# Patient Record
Sex: MALE | Race: WHITE | NOT HISPANIC OR LATINO | Employment: FULL TIME | URBAN - METROPOLITAN AREA
[De-identification: names, ages, dates, MRNs, and addresses within clinical notes are randomized per-mention and may not be internally consistent; named-entity substitution may affect disease eponyms.]

---

## 2020-10-19 ENCOUNTER — TRANSFERRED RECORDS (OUTPATIENT)
Dept: HEALTH INFORMATION MANAGEMENT | Facility: CLINIC | Age: 55
End: 2020-10-19

## 2020-11-06 ENCOUNTER — TELEPHONE (OUTPATIENT)
Dept: CARDIOLOGY | Facility: CLINIC | Age: 55
End: 2020-11-06

## 2020-11-06 NOTE — TELEPHONE ENCOUNTER
Pt was seen at [facility] in care everywhere.     Called and asked for EKG rhythmn strip from 9/25/2020 to be faxed.     Also asked for them to mail disc with echo done on 8/27/2020.    Rajni Taylor CMA......November 6, 2020     10:03 AM

## 2020-11-07 ENCOUNTER — HEALTH MAINTENANCE LETTER (OUTPATIENT)
Age: 55
End: 2020-11-07

## 2020-11-13 ENCOUNTER — OFFICE VISIT (OUTPATIENT)
Dept: CARDIOLOGY | Facility: CLINIC | Age: 55
End: 2020-11-13
Payer: COMMERCIAL

## 2020-11-13 VITALS
SYSTOLIC BLOOD PRESSURE: 162 MMHG | RESPIRATION RATE: 16 BRPM | OXYGEN SATURATION: 97 % | WEIGHT: 185 LBS | DIASTOLIC BLOOD PRESSURE: 74 MMHG | HEART RATE: 74 BPM

## 2020-11-13 DIAGNOSIS — I25.5 ISCHEMIC CARDIOMYOPATHY: ICD-10-CM

## 2020-11-13 DIAGNOSIS — I47.29 NSVT (NONSUSTAINED VENTRICULAR TACHYCARDIA) (H): ICD-10-CM

## 2020-11-13 DIAGNOSIS — I25.10 CORONARY ARTERY DISEASE INVOLVING NATIVE CORONARY ARTERY OF NATIVE HEART WITHOUT ANGINA PECTORIS: Primary | ICD-10-CM

## 2020-11-13 DIAGNOSIS — I49.3 PVC'S (PREMATURE VENTRICULAR CONTRACTIONS): ICD-10-CM

## 2020-11-13 PROCEDURE — 99204 OFFICE O/P NEW MOD 45 MIN: CPT | Performed by: INTERNAL MEDICINE

## 2020-11-13 RX ORDER — NITROGLYCERIN 0.4 MG/1
TABLET SUBLINGUAL
COMMUNITY
Start: 2020-09-11

## 2020-11-13 RX ORDER — CLOPIDOGREL BISULFATE 75 MG/1
75 TABLET ORAL
COMMUNITY
Start: 2020-09-12 | End: 2021-09-27

## 2020-11-13 RX ORDER — LISINOPRIL 10 MG/1
10 TABLET ORAL DAILY
Qty: 90 TABLET | Refills: 3 | Status: SHIPPED | OUTPATIENT
Start: 2020-11-13 | End: 2021-11-05

## 2020-11-13 RX ORDER — AMLODIPINE BESYLATE 10 MG/1
TABLET ORAL
COMMUNITY
Start: 2020-08-03 | End: 2021-09-01

## 2020-11-13 RX ORDER — METOPROLOL SUCCINATE 25 MG/1
25 TABLET, EXTENDED RELEASE ORAL
COMMUNITY
Start: 2020-11-05 | End: 2021-08-10

## 2020-11-13 RX ORDER — ATORVASTATIN CALCIUM 40 MG/1
40 TABLET, FILM COATED ORAL
COMMUNITY
Start: 2020-09-11 | End: 2021-09-09

## 2020-11-13 NOTE — PATIENT INSTRUCTIONS
Stress cardiac MRI at [facility] next available.   Get home BP monitor. Check it once daily.     Start Lisinopril 10 mg every day.   Check labs in 1 week.

## 2020-11-13 NOTE — PROGRESS NOTES
[facility] Cardiology Consultation:    Assessment and Plan:     1.  Multivessel CAD, PCI to mid LAD 9/2020,  of OM 2 and proximal RCA, mildly reduced LV function: No angina or CHF.   Continue aspirin lifelong, Plavix for 1 year.  Continue Lipitor, check lipid profile with goal LDL less than 70.  2.  Mild ischemic cardiomyopathy, LVEF 45 to 50%: Continue Toprol 25 mg.  Start lisinopril 10 mg, check BMP in 1 week.  3.  High PVC burden of 25%, asymptomatic: We will evaluate further with stress cardiac MRI.  4.  Hypertension: High office BP today.  Asked to get blood pressure monitor and monitor at home.      Omkar Jimenez MD    Cardiac Imaging and Prevention  [facility]  [email or web address] Pager: [phone]    HPI: He is here to establish cardiac care.  His cardiac history started a few months ago when he was diagnosed with PVCs.  An echocardiogram then showed mild cardiomyopathy that appeared to be ischemic.  A coronary angiogram was then done that showed multivessel CAD.  He received a stent to his mid LAD, and also had occlusion of his proximal RCA and OM 2 branch that were collateralized and not intervened upon.  He has been started on appropriate medical therapy.  A cardiac MRI was discussed at [facility].  Recent cardiac monitor showed frequent PVCs with a burden of 25%.  He is asymptomatic from his palpitations.  In fact he has no symptoms at all.  He is tolerating his new medications without any issues.   Denies any chest pain or pressure, shortness of breath/dyspnea, orthopnea, pnd, palpitations, syncope/presyncope or edema.  EXAM:  BP (!) 162/74   Pulse 74   Resp 16   Wt 83.9 kg (185 lb)   SpO2 97%   GEN/CONSTITUIONAL: Appears comfortable, in no apparent distress   EYES: No icterus  ENT/MOUTH: Normal  JVP:  Not visible  RESPIRATORY: Clear to auscultation bilaterally   CARDIOVASCULAR: Regular S1 and S2, no murmurs, rubs, or gallops.   ABDOMEN: Soft,  non-tender, positive bowel sounds   NEUROLOGIC: Grossly non-focal   PSYCHIATRIC: Normal affect  EXT: No cyanosis, clubbing, edema. Normal pedal pulses.  Skin: No petechiae, purpura or rash    PAST MEDICAL HISTORY:  Past Medical History:   Diagnosis Date     Benign essential hypertension      Coronary artery disease involving native coronary artery of native heart without angina pectoris     PCI to LAD 09/2020     PVC's (premature ventricular contractions)        CURRENT MEDICATIONS:  Current Outpatient Medications   Medication     amLODIPine (NORVASC) 10 MG tablet     aspirin (ASA) 81 MG EC tablet     atorvastatin (LIPITOR) 40 MG tablet     cholecalciferol (VITAMIN D3) 25 mcg (1000 units) capsule     clopidogrel (PLAVIX) 75 MG tablet     metoprolol succinate ER (TOPROL-XL) 25 MG 24 hr tablet     nitroGLYcerin (NITROSTAT) 0.4 MG sublingual tablet     No current facility-administered medications for this visit.        PAST SURGICAL HISTORY:  No past surgical history on file.    ALLERGIES     Allergies   Allergen Reactions     Simvastatin      Other reaction(s): Unknown  Did not feel right          FAMILY HISTORY:  No family history on file.    SOCIAL HISTORY:  Social History     Socioeconomic History     Marital status:      Spouse name: Not on file     Number of children: Not on file     Years of education: Not on file     Highest education level: Not on file   Occupational History     Not on file   Social Needs     Financial resource strain: Not on file     Food insecurity     Worry: Not on file     Inability: Not on file     Transportation needs     Medical: Not on file     Non-medical: Not on file   Tobacco Use     Smoking status: Not on file   Substance and Sexual Activity     Alcohol use: Not on file     Drug use: Not on file     Sexual activity: Not on file   Lifestyle     Physical activity     Days per week: Not on file     Minutes per session: Not on file     Stress: Not on file   Relationships      Social connections     Talks on phone: Not on file     Gets together: Not on file     Attends [employer]alian service: Not on file     Active member of club or organization: Not on file     Attends meetings of clubs or organizations: Not on file     Relationship status: Not on file     Intimate partner violence     Fear of current or ex partner: Not on file     Emotionally abused: Not on file     Physically abused: Not on file     Forced sexual activity: Not on file   Other Topics Concern     Not on file   Social History Narrative     Not on file       ROS:   Constitutional: No fever, chills, or sweats. No weight gain/loss   ENT: No visual disturbance, ear ache, epistaxis, sore throat  Allergies/Immunologic: Negative.   Respiratory: No cough, hemoptysia  Cardiovascular: As per HPI  GI: No nausea, vomiting, hematemesis, melena, or hematochezia  : No urinary frequency, dysuria, or hematuria  Integument: Negative  Psychiatric: Negative  Neuro: Negative  Endocrinology: Negative   Musculoskeletal: Negative    ADDITIONAL COMMENTS:     I reviewed the patient's medications:     I reviewed the patient's pertinent clinical laboratory studies:     I reviewed the patient's pertinent imaging studies:   I reviewed the patient's ECG:       Cath 09/2020  Diagnostic Summary    * Left main is a large-caliber normal vessel. Left anterior ascending artery  is a moderate-caliber vessel and gives rise to 1 major proximal diagonal  branch.  The diagonal branch is normal. Mid LAD has mild 30% narrowing and  another  area of diffuse 70% narrowing.  It is a wrap-around LAD. Left  circumflex is a large-caliber vessel.  There is a small caliber first obtuse  marginal branch which is normal.  There is a small occluded second obtuse  marginal branch which is filled via collaterals.  The third obtuse marginal  branch is small in caliber and appears normal.  The AV groove LCx continues as  a moderate caliber distal PDA which has mild disease. Right  coronary artery  appears to be a small caliber, possibly codominant, vessel which is occluded  proximally.  Extensive collaterals seen filling the distal RCA via left to  right and right to right.    Echo Interpretation Summary    * The left ventricular systolic function is mildly decreased, estimated LVEF  45-50%.    * Left ventricular wall motion is abnormal with focal segmental hypokinesis  involving basal to mid inferior and basal inferolateral and inferoseptal  walls..    * The left ventricle is moderately enlarged.    * Normal right ventricular systolic function.    * The proximal ascending aorta is borderline dilated measuring 3.8 cm.    * No pulmonary hypertension, estimated right ventricular systolic pressure  is 29 mmHg.    * There is no hemodynamically significant valvular heart disease.    * No prior study.

## 2020-11-20 DIAGNOSIS — I25.10 CORONARY ARTERY DISEASE INVOLVING NATIVE CORONARY ARTERY OF NATIVE HEART WITHOUT ANGINA PECTORIS: ICD-10-CM

## 2020-11-20 LAB
ANION GAP SERPL CALCULATED.3IONS-SCNC: 2 MMOL/L (ref 3–14)
BUN SERPL-MCNC: 12 MG/DL (ref 7–30)
CALCIUM SERPL-MCNC: 8.9 MG/DL (ref 8.5–10.1)
CHLORIDE SERPL-SCNC: 104 MMOL/L (ref 94–109)
CHOLEST SERPL-MCNC: 136 MG/DL
CO2 SERPL-SCNC: 32 MMOL/L (ref 20–32)
CREAT SERPL-MCNC: 0.83 MG/DL (ref 0.66–1.25)
GFR SERPL CREATININE-BSD FRML MDRD: >90 ML/MIN/{1.73_M2}
GLUCOSE SERPL-MCNC: 105 MG/DL (ref 70–99)
HDLC SERPL-MCNC: 79 MG/DL
LDLC SERPL CALC-MCNC: 45 MG/DL
NONHDLC SERPL-MCNC: 57 MG/DL
POTASSIUM SERPL-SCNC: 4.2 MMOL/L (ref 3.4–5.3)
SODIUM SERPL-SCNC: 138 MMOL/L (ref 133–144)
TRIGL SERPL-MCNC: 60 MG/DL

## 2020-11-20 PROCEDURE — 36415 COLL VENOUS BLD VENIPUNCTURE: CPT | Performed by: INTERNAL MEDICINE

## 2020-11-20 PROCEDURE — 80061 LIPID PANEL: CPT | Performed by: INTERNAL MEDICINE

## 2020-11-20 PROCEDURE — 80048 BASIC METABOLIC PNL TOTAL CA: CPT | Performed by: INTERNAL MEDICINE

## 2020-12-02 ENCOUNTER — VIRTUAL VISIT (OUTPATIENT)
Dept: DERMATOLOGY | Facility: CLINIC | Age: 55
End: 2020-12-02
Payer: COMMERCIAL

## 2020-12-02 DIAGNOSIS — D48.5 NEOPLASM OF UNCERTAIN BEHAVIOR OF SKIN: ICD-10-CM

## 2020-12-02 DIAGNOSIS — L82.1 SEBORRHEIC KERATOSES: Primary | ICD-10-CM

## 2020-12-02 PROCEDURE — 99202 OFFICE O/P NEW SF 15 MIN: CPT | Mod: TEL | Performed by: PHYSICIAN ASSISTANT

## 2020-12-02 NOTE — LETTER
12/2/2020         RE: Jerrod Khan  [address] [address]        Dear Colleague,    Thank you for referring your patient, Jerrod Khan, to the [facility]. Please see a copy of my visit note below.    [facility] Dermatology Record:  Store and Forward and Telephone [phone]      Dermatology Problem List:  1. SK  2. NUB - back - ddx: lentigo vs MIS/DN - recheck in person -possibly bx    Encounter Date: Dec 2, 2020    CC:   Chief Complaint   Patient presents with     Derm Problem       History of Present Illness:  Jerrod Khan is a 55 year old male who presents for a spot check. No personal or fhx of skin cancer.     Lesion on back has been present 1-2 years, wife noticed it, no associated sx. Located on L upper back.    Additional spot on the L side of the back, present about 1-2 years as well. Has had blistering sun burns in the past. Does use sunscreen now. Otherwise well, no additional concerns.       ROS: Patient is generally feeling well today   -Constitutional: no unintended weight loss/gain, no night sweats or fevers  -Skin as per HPI    Physical Examination:  General: Well-appearing, appropriately-developed individual.  Skin: Focused examination including back was performed.   -waxy stuck on papule on the L back - appears benign, consistent with SK  -L back with tan asymmetrical macule - ddx: lentigo vs other      Past Medical History:   There is no problem list on file for this patient.    Past Medical History:   Diagnosis Date     Benign essential hypertension      Coronary artery disease involving native coronary artery of native heart without angina pectoris     PCI to LAD 09/2020     PVC's (premature ventricular contractions)      No past surgical history on file.    Social History:  Patient     Family History:  No family history on file.    Medications:  Current Outpatient Medications   Medication     amLODIPine (NORVASC)  10 MG tablet     aspirin (ASA) 81 MG EC tablet     atorvastatin (LIPITOR) 40 MG tablet     cholecalciferol (VITAMIN D3) 25 mcg (1000 units) capsule     clopidogrel (PLAVIX) 75 MG tablet     lisinopril (ZESTRIL) 10 MG tablet     metoprolol succinate ER (TOPROL-XL) 25 MG 24 hr tablet     nitroGLYcerin (NITROSTAT) 0.4 MG sublingual tablet     No current facility-administered medications for this visit.           Allergies   Allergen Reactions     Simvastatin      Other reaction(s): Unknown  Did not feel right          Impression and Recommendations (Patient Counseled on the Following):  1. SK -back x1  -Lesion is clinically benign and no intervention is needed at this time. Recommend re-evaluation if lesions grows or symptoms change.     2. Lesion to monitor - L back - tan asymmetrical macule - ddx: lentigo vs nevus vs DN vs MIS vs other   - come in for potential biopsy of this lesion pending in person examination and FBSE in next 1-2mo    Follow-up:   Follow-up with dermatology in approximately 1-2mo. Earlier for new or changing lesions or rash.   CC Dr. Nelson on close of this encounter.      Staff only    All risks, benefits and alternatives were discussed with patient.  Patient is in agreement and understands the assessment and plan.  All questions were answered.    Laurence Newman PA-C, MPAS  [facility] [place]: Phone: [phone], Fax: [phone]  [facility]: Phone: [phone],  Fax: [phone]  _____________________________________________________________________________    Teledermatology information:  - Location of patient: [place]  - Patient presented as: self referral  - Location of teledermatologist:  ([facility] )  - Image quality and interpretability: acceptable  - Physician has received verbal consent for a Video/Photos Visit from the patient? YES  - In-person dermatology visit  "recommendation: yes  - Date of images: 11/11/20  - Length of call: 7min  - Date of report: 12/2/2020     Teledermatology Nurse Call for NEW Patients (not seen in last 3 years):     The patient was contacted by phone and we reviewed they have a visit in teledermatology upcoming with an MD or PARVEEN;  Importantly, \"a teledermatology visit may not be as thorough as an in-person visit, and the quality of the photograph and/or video sent may not be of the same quality as that taken by the dermatology clinic.\"     This video visit will be conducted via a call between you and your physician/provider via [employer]. We have found that certain health care needs can be provided without the need for an in-person physical exam.  This service lets us provide the care you need with a video conversation.  If a prescription is necessary we can send it directly to your pharmacy.  If lab work is needed we can place an order for that and you can then stop by our lab to have the test done at a later time. Visits are billed at different rates depending on your insurance coverage. Please reach out to your insurance provider with any questions.    The patient will also send photographs via [employer] for review. Instructions for photography are/were sent to the patient via [employer] messaging.       The patient verified the location of the photo/video visit to be [place].(The physician must be notified by nurse if the patient is not in the state of [place] during the encounter)    The patient denied skin pain, fever, mucosal symptoms(lesions, blisters, sores in the mouth, nose, eyes, or genitals)  IF PATIENT ENDORSES ANY OF THESE STOP AND PAGE ON CALL ATTENDING    Additional notes:  Patient summary of issue: Spot on back. Has grown over the last year. Has had spot at least a few years.  Location of problem on body: Back  How long has area/symptoms been present: Over the last year it has grown  What makes it better?: N/a  What makes it worse?: " n/a  Other symptoms include the following: N/a  Which medications have been tried, for how long, and did they make it better or worse (ex. Triamcinolone, used twice daily for 2 weeks, not improved): n/a  The patient has not seen a dermatologist.   The patient hasno past medical history of skin cancer  ROS: The patient is generally feeling well.     Yvette Palencia LPN        Again, thank you for allowing me to participate in the care of your patient.        Sincerely,        Laurence Newman PA-C

## 2020-12-02 NOTE — PROGRESS NOTES
[facility] Dermatology Record:  Store and Forward and Telephone [phone]      Dermatology Problem List:  1. SK  2. NUB - back - ddx: lentigo vs MIS/DN - recheck in person -possibly bx    Encounter Date: Dec 2, 2020    CC:   Chief Complaint   Patient presents with     Derm Problem       History of Present Illness:  Jerrod Khan is a 55 year old male who presents for a spot check. No personal or fhx of skin cancer.     Lesion on back has been present 1-2 years, wife noticed it, no associated sx. Located on L upper back.    Additional spot on the L side of the back, present about 1-2 years as well. Has had blistering sun burns in the past. Does use sunscreen now. Otherwise well, no additional concerns.       ROS: Patient is generally feeling well today   -Constitutional: no unintended weight loss/gain, no night sweats or fevers  -Skin as per HPI    Physical Examination:  General: Well-appearing, appropriately-developed individual.  Skin: Focused examination including back was performed.   -waxy stuck on papule on the L back - appears benign, consistent with SK  -L back with tan asymmetrical macule - ddx: lentigo vs other      Past Medical History:   There is no problem list on file for this patient.    Past Medical History:   Diagnosis Date     Benign essential hypertension      Coronary artery disease involving native coronary artery of native heart without angina pectoris     PCI to LAD 09/2020     PVC's (premature ventricular contractions)      No past surgical history on file.    Social History:  Patient     Family History:  No family history on file.    Medications:  Current Outpatient Medications   Medication     amLODIPine (NORVASC) 10 MG tablet     aspirin (ASA) 81 MG EC tablet     atorvastatin (LIPITOR) 40 MG tablet     cholecalciferol (VITAMIN D3) 25 mcg (1000 units) capsule     clopidogrel (PLAVIX) 75 MG tablet     lisinopril (ZESTRIL) 10 MG tablet     metoprolol succinate ER (TOPROL-XL) 25 MG 24  hr tablet     nitroGLYcerin (NITROSTAT) 0.4 MG sublingual tablet     No current facility-administered medications for this visit.           Allergies   Allergen Reactions     Simvastatin      Other reaction(s): Unknown  Did not feel right          Impression and Recommendations (Patient Counseled on the Following):  1. SK -back x1  -Lesion is clinically benign and no intervention is needed at this time. Recommend re-evaluation if lesions grows or symptoms change.     2. Lesion to monitor - L back - tan asymmetrical macule - ddx: lentigo vs nevus vs DN vs MIS vs other   - come in for potential biopsy of this lesion pending in person examination and FBSE in next 1-2mo    Follow-up:   Follow-up with dermatology in approximately 1-2mo. Earlier for new or changing lesions or rash.   CC Dr. Nelson on close of this encounter.      Staff only    All risks, benefits and alternatives were discussed with patient.  Patient is in agreement and understands the assessment and plan.  All questions were answered.    Laurence Newman PA-C, MPAS  [facility]: Phone: [phone], Fax: [phone]  [facility]: Phone: [phone],  Fax: [phone]  _____________________________________________________________________________    Teledermatology information:  - Location of patient: [place]  - Patient presented as: self referral  - Location of teledermatologist:  ([facility] )  - Image quality and interpretability: acceptable  - Physician has received verbal consent for a Video/Photos Visit from the patient? YES  - In-person dermatology visit recommendation: yes  - Date of images: 11/11/20  - Length of call: 7min  - Date of report: 12/2/2020     Teledermatology Nurse Call for NEW Patients (not seen in last 3 years):     The patient was contacted by phone and we reviewed they have a visit in teledermatology upcoming with  "an MD or PARVEEN;  Importantly, \"a teledermatology visit may not be as thorough as an in-person visit, and the quality of the photograph and/or video sent may not be of the same quality as that taken by the dermatology clinic.\"     This video visit will be conducted via a call between you and your physician/provider via [employer]. We have found that certain health care needs can be provided without the need for an in-person physical exam.  This service lets us provide the care you need with a video conversation.  If a prescription is necessary we can send it directly to your pharmacy.  If lab work is needed we can place an order for that and you can then stop by our lab to have the test done at a later time. Visits are billed at different rates depending on your insurance coverage. Please reach out to your insurance provider with any questions.    The patient will also send photographs via [employer] for review. Instructions for photography are/were sent to the patient via [employer] messaging.       The patient verified the location of the photo/video visit to be [place].(The physician must be notified by nurse if the patient is not in the state of [place] during the encounter)    The patient denied skin pain, fever, mucosal symptoms(lesions, blisters, sores in the mouth, nose, eyes, or genitals)  IF PATIENT ENDORSES ANY OF THESE STOP AND PAGE ON CALL ATTENDING    Additional notes:  Patient summary of issue: Spot on back. Has grown over the last year. Has had spot at least a few years.  Location of problem on body: Back  How long has area/symptoms been present: Over the last year it has grown  What makes it better?: N/a  What makes it worse?: n/a  Other symptoms include the following: N/a  Which medications have been tried, for how long, and did they make it better or worse (ex. Triamcinolone, used twice daily for 2 weeks, not improved): n/a  The patient has not seen a dermatologist.   The patient hasno past medical history of " skin cancer  ROS: The patient is generally feeling well.     Yvette Palencia LPN

## 2020-12-02 NOTE — PATIENT INSTRUCTIONS
[facility] Dermatology Visit    Thank you for allowing us to participate in your care. Your findings, instructions and follow-up plan are as follows:    Follow up for in person exam    When should I call my doctor?    If you are worsening or not improving, please, contact us or seek urgent care as noted below.     Who should I call with questions (adults)?    [facility] (adult and pediatric): [phone]     [facility] (adult): [phone]    For urgent needs outside of business hours call the [facility] at [phone] and ask for the dermatology resident on call    If this is a medical emergency and you are unable to reach an ER, Call 911      Who should I call with questions (pediatric)?  [facility]- Pediatric Dermatology  Dr. Penny Roberts, Dr. Robert Tanner, Dr. Cherelle Rodriguez, Shu Chavez, PA  Dr. Marlys Chu, Dr. Maria Del Carmen Murguia & Dr. Oliver Nuñez  Non Urgent  Nurse Triage Line; [phone]- Patricia and Erendira GONZALES Care Coordinators   Dawna (/Complex ) [phone]    If you need a prescription refill, please contact your pharmacy. Refills are approved or denied by our Physicians during normal business hours, Monday through Fridays  Per office policy, refills will not be granted if you have not been seen within the past year (or sooner depending on your child's condition)    Scheduling Information:  Pediatric Appointment Scheduling and Call Center [phone]  Radiology Scheduling- [phone]  Sedation Unit Scheduling- [phone]  [place] Scheduling- General [phone]; Pediatric Dermatology [phone]  Main  Services: [phone]  Albanian: [phone]  North Korean: [phone]  Hmong/Somali/Persian: [phone]  Preadmission Nursing Department Fax Number: [phone] (Fax all pre-operative paperwork to this number)    For urgent matters  arising during evenings, weekends, or holidays that cannot wait for normal business hours please call [phone] and ask for the Dermatology Resident On-Call to be paged.

## 2020-12-30 ENCOUNTER — HOSPITAL ENCOUNTER (OUTPATIENT)
Dept: MRI IMAGING | Facility: CLINIC | Age: 55
Discharge: HOME | End: 2020-12-30
Attending: INTERNAL MEDICINE | Admitting: INTERNAL MEDICINE
Payer: COMMERCIAL

## 2020-12-30 VITALS
SYSTOLIC BLOOD PRESSURE: 122 MMHG | RESPIRATION RATE: 18 BRPM | OXYGEN SATURATION: 98 % | HEART RATE: 48 BPM | DIASTOLIC BLOOD PRESSURE: 67 MMHG

## 2020-12-30 DIAGNOSIS — I25.10 CORONARY ARTERY DISEASE INVOLVING NATIVE CORONARY ARTERY OF NATIVE HEART WITHOUT ANGINA PECTORIS: ICD-10-CM

## 2020-12-30 DIAGNOSIS — I25.5 ISCHEMIC CARDIOMYOPATHY: ICD-10-CM

## 2020-12-30 DIAGNOSIS — I47.29 NSVT (NONSUSTAINED VENTRICULAR TACHYCARDIA) (H): ICD-10-CM

## 2020-12-30 DIAGNOSIS — I49.3 PVC'S (PREMATURE VENTRICULAR CONTRACTIONS): ICD-10-CM

## 2020-12-30 PROCEDURE — A9585 GADOBUTROL INJECTION: HCPCS | Performed by: INTERNAL MEDICINE

## 2020-12-30 PROCEDURE — 250N000011 HC RX IP 250 OP 636: Performed by: STUDENT IN AN ORGANIZED HEALTH CARE EDUCATION/TRAINING PROGRAM

## 2020-12-30 PROCEDURE — 93017 CV STRESS TEST TRACING ONLY: CPT

## 2020-12-30 PROCEDURE — 93018 CV STRESS TEST I&R ONLY: CPT | Performed by: STUDENT IN AN ORGANIZED HEALTH CARE EDUCATION/TRAINING PROGRAM

## 2020-12-30 PROCEDURE — 93010 ELECTROCARDIOGRAM REPORT: CPT | Mod: 76 | Performed by: INTERNAL MEDICINE

## 2020-12-30 PROCEDURE — 255N000002 HC RX 255 OP 636: Performed by: INTERNAL MEDICINE

## 2020-12-30 PROCEDURE — 75563 CARD MRI W/STRESS IMG & DYE: CPT

## 2020-12-30 PROCEDURE — 93005 ELECTROCARDIOGRAM TRACING: CPT

## 2020-12-30 PROCEDURE — 93016 CV STRESS TEST SUPVJ ONLY: CPT | Performed by: STUDENT IN AN ORGANIZED HEALTH CARE EDUCATION/TRAINING PROGRAM

## 2020-12-30 PROCEDURE — 999N000054 HC STATISTIC EKG NON-CHARGEABLE

## 2020-12-30 PROCEDURE — 75563 CARD MRI W/STRESS IMG & DYE: CPT | Mod: 26 | Performed by: STUDENT IN AN ORGANIZED HEALTH CARE EDUCATION/TRAINING PROGRAM

## 2020-12-30 RX ORDER — AMINOPHYLLINE 25 MG/ML
100 INJECTION, SOLUTION INTRAVENOUS ONCE
Status: COMPLETED | OUTPATIENT
Start: 2020-12-30 | End: 2020-12-30

## 2020-12-30 RX ORDER — DIPHENHYDRAMINE HCL 25 MG
25 CAPSULE ORAL
Status: DISCONTINUED | OUTPATIENT
Start: 2020-12-30 | End: 2020-12-31 | Stop reason: HOSPADM

## 2020-12-30 RX ORDER — ACYCLOVIR 200 MG/1
0-1 CAPSULE ORAL
Status: DISCONTINUED | OUTPATIENT
Start: 2020-12-30 | End: 2020-12-31 | Stop reason: HOSPADM

## 2020-12-30 RX ORDER — CAFFEINE CITRATE 20 MG/ML
60 SOLUTION INTRAVENOUS
Status: DISCONTINUED | OUTPATIENT
Start: 2020-12-30 | End: 2020-12-31 | Stop reason: HOSPADM

## 2020-12-30 RX ORDER — REGADENOSON 0.08 MG/ML
0.4 INJECTION, SOLUTION INTRAVENOUS ONCE
Status: COMPLETED | OUTPATIENT
Start: 2020-12-30 | End: 2020-12-30

## 2020-12-30 RX ORDER — GADOBUTROL 604.72 MG/ML
10 INJECTION INTRAVENOUS ONCE
Status: COMPLETED | OUTPATIENT
Start: 2020-12-30 | End: 2020-12-30

## 2020-12-30 RX ORDER — ALBUTEROL SULFATE 90 UG/1
2 AEROSOL, METERED RESPIRATORY (INHALATION) EVERY 5 MIN PRN
Status: DISCONTINUED | OUTPATIENT
Start: 2020-12-30 | End: 2020-12-31 | Stop reason: HOSPADM

## 2020-12-30 RX ORDER — DIPHENHYDRAMINE HYDROCHLORIDE 50 MG/ML
25-50 INJECTION INTRAMUSCULAR; INTRAVENOUS
Status: DISCONTINUED | OUTPATIENT
Start: 2020-12-30 | End: 2020-12-31 | Stop reason: HOSPADM

## 2020-12-30 RX ORDER — ONDANSETRON 2 MG/ML
4 INJECTION INTRAMUSCULAR; INTRAVENOUS
Status: DISCONTINUED | OUTPATIENT
Start: 2020-12-30 | End: 2020-12-31 | Stop reason: HOSPADM

## 2020-12-30 RX ORDER — METHYLPREDNISOLONE SODIUM SUCCINATE 125 MG/2ML
125 INJECTION, POWDER, LYOPHILIZED, FOR SOLUTION INTRAMUSCULAR; INTRAVENOUS
Status: DISCONTINUED | OUTPATIENT
Start: 2020-12-30 | End: 2020-12-31 | Stop reason: HOSPADM

## 2020-12-30 RX ORDER — DIAZEPAM 5 MG
5 TABLET ORAL EVERY 30 MIN PRN
Status: DISCONTINUED | OUTPATIENT
Start: 2020-12-30 | End: 2020-12-31 | Stop reason: HOSPADM

## 2020-12-30 RX ADMIN — GADOBUTROL 10 ML: 604.72 INJECTION INTRAVENOUS at 10:13

## 2020-12-30 RX ADMIN — REGADENOSON 0.4 MG: 0.08 INJECTION, SOLUTION INTRAVENOUS at 09:32

## 2020-12-30 RX ADMIN — AMINOPHYLLINE 100 MG: 25 INJECTION, SOLUTION INTRAVENOUS at 09:38

## 2020-12-30 NOTE — PROGRESS NOTES
Pt arrived for cardiac MRI with stress. Allergies, medications, and safety checklist reviewed with patient. Test explained and all questions were answered. Denies caffeine intake. Lungs are clear. Lexiscan 0.4 mg given over 10 seconds followed by 5 mL of saline. After stress imaging complete, patient was given 100 mg IV Aminophylline per MD order. Pt tolerated the scan, medications, and contrast well. Pre and post EKG completed. Pt monitored after MRI and escorted back to [facility] waiting room.

## 2021-01-04 LAB
INTERPRETATION ECG - MUSE: NORMAL
INTERPRETATION ECG - MUSE: NORMAL

## 2021-01-22 ENCOUNTER — OFFICE VISIT (OUTPATIENT)
Dept: CARDIOLOGY | Facility: CLINIC | Age: 56
End: 2021-01-22
Payer: COMMERCIAL

## 2021-01-22 VITALS — HEART RATE: 52 BPM | SYSTOLIC BLOOD PRESSURE: 124 MMHG | DIASTOLIC BLOOD PRESSURE: 69 MMHG | OXYGEN SATURATION: 100 %

## 2021-01-22 DIAGNOSIS — I25.5 ISCHEMIC CARDIOMYOPATHY: Primary | ICD-10-CM

## 2021-01-22 DIAGNOSIS — I49.3 PVC'S (PREMATURE VENTRICULAR CONTRACTIONS): ICD-10-CM

## 2021-01-22 PROCEDURE — 99214 OFFICE O/P EST MOD 30 MIN: CPT | Mod: GC | Performed by: INTERNAL MEDICINE

## 2021-01-22 RX ORDER — SILDENAFIL 100 MG/1
100 TABLET, FILM COATED ORAL
COMMUNITY
Start: 2020-08-03

## 2021-01-22 ASSESSMENT — PAIN SCALES - GENERAL: PAINLEVEL: NO PAIN (0)

## 2021-01-22 NOTE — NURSING NOTE
Jerrod Khan's goals for this visit include:   Chief Complaint   Patient presents with     RECHECK     3 month follow up, MRI       He requests these members of his care team be copied on today's visit information: no    PCP: Bernard Sosa    Referring Provider:  No referring provider defined for this encounter.    /69 (BP Location: Left arm, Patient Position: Sitting, Cuff Size: Adult Large)   Pulse 52   SpO2 100%     Do you need any medication refills at today's visit? No    Rajni Taylor CMA......January 22, 2021     8:09 AM

## 2021-01-22 NOTE — PROGRESS NOTES
[facility] Cardiology Consultation:    Assessment and Plan:     1.  Multivessel CAD, PCI to mid LAD 9/2020,  of OM 2 and proximal RCA, mildly reduced LV function: No angina or CHF.   Continue aspirin lifelong, Plavix for at least 1 year (can stop 9/2021 if needed, but continue beyond 9/2021 until next f/u appointment to discuss discontinuation).  Continue Lipitor 40, goal LDL less than 70  2.  Mild ischemic cardiomyopathy, LVEF 50% (stress CMR - small multivessel MIs, mild RCA ischemia): Continue Toprol 25 mg.  Continue lisinopril 10 mg and amlodipine 10 mg  3.  High PVC burden of 25%, asymptomatic, most likely scar-mediated, no acute interventions required currently. Pt's resting HR is in the high 50's (maximally beta-blocked for now), thus will refrain from uptitrating metoprolol for now.   4.  Hypertension: now controlled, has BP cuff at home, home BP log is within ideal range    RTC in 1 yr with echocardiogram before appointment    Patient was seen and discussed with the attending physician, Dr. Omkar Jimenez MD.     Bernrad Bruno MD, PhD  Cardiology Fellow  Click to text page [phone]    [facility] Cardiovascular Division    I have seen and examined the patient, reviewed labs and tests. I have discussed my findings and treatment recommendations with the house staff and/or Cardiology fellow and agree with their assessment and plan as outlined in the note.    Omkar Jimenez MD    Cardiac Imaging and Prevention  [facility]  Pager: [phone]        HPI: He is here to establish cardiac care.  His cardiac history started a few months ago when he was diagnosed with PVCs.  An echocardiogram then showed mild cardiomyopathy that appeared to be ischemic.  A coronary angiogram was then done that showed multivessel CAD.  He received a stent to his mid LAD, and also had occlusion of his proximal RCA and OM 2 branch that were collateralized and not  "intervened upon.  He has been started on appropriate medical therapy.  A cardiac MRI was discussed at [facility].  Recent cardiac monitor showed frequent PVCs with a burden of 25%.  He is asymptomatic from his palpitations.  In fact he has no symptoms at all.  He is tolerating his new medications without any issues.   Denies any chest pain or pressure, shortness of breath/dyspnea, orthopnea, pnd, palpitations, syncope/presyncope or edema.    January 22, 2021 Interval history: Pt had cMRI last month, he thought the test went on \"forever\". Still feels \"fine\". Pt has never been symptomatic. He will walk on his elliptical for 1 hr a day. He also brought in a daily blood pressure log. Just about all readings were within ideal range. Pt is experiencing some easy bruising, but no melena or hematochezia.   We personally reviewed his recent stress cardiac MRI.  It showed expected findings of ischemic cardiomyopathy, mildly reduced LV function with LVEF of 51%, small MIs in all 3 coronary territories.  Fortunately, it only showed mild ischemia in the RCA territory.  Of note, pt's father had CABG in his early 50's, and had previous MI before the CABG.     Denies chest pain, dyspnea, orthopnea, palpitation, dizziness, syncope or edema.       EXAM:  /69 (BP Location: Left arm, Patient Position: Sitting, Cuff Size: Adult Large)   Pulse 52   SpO2 100%   GEN/CONSTITUIONAL: Appears comfortable, in no apparent distress   EYES: No icterus  ENT/MOUTH: Normal  JVP:  Not visible  RESPIRATORY: Clear to auscultation bilaterally   CARDIOVASCULAR: Regular S1 and S2, no murmurs, rubs, or gallops.   ABDOMEN: Soft, non-tender, positive bowel sounds   NEUROLOGIC: Grossly non-focal   PSYCHIATRIC: Normal affect  EXT: No cyanosis, clubbing, edema. Normal pedal pulses.  Skin: No petechiae, purpura or rash    PAST MEDICAL HISTORY:  Past Medical History:   Diagnosis Date     Benign essential hypertension      Coronary artery disease " involving native coronary artery of native heart without angina pectoris     PCI to LAD 09/2020     Ischemic cardiomyopathy      PVC's (premature ventricular contractions)        CURRENT MEDICATIONS:  Current Outpatient Medications   Medication     amLODIPine (NORVASC) 10 MG tablet     aspirin (ASA) 81 MG EC tablet     atorvastatin (LIPITOR) 40 MG tablet     cholecalciferol (VITAMIN D3) 25 mcg (1000 units) capsule     clopidogrel (PLAVIX) 75 MG tablet     lisinopril (ZESTRIL) 10 MG tablet     metoprolol succinate ER (TOPROL-XL) 25 MG 24 hr tablet     nitroGLYcerin (NITROSTAT) 0.4 MG sublingual tablet     sildenafil (VIAGRA) 100 MG tablet     No current facility-administered medications for this visit.        PAST SURGICAL HISTORY:  Past Surgical History:   Procedure Laterality Date     REPAIR CLEFT LIP         ALLERGIES     Allergies   Allergen Reactions     Simvastatin      Other reaction(s): Unknown  Did not feel right          FAMILY HISTORY:  Family History   Problem Relation Age of Onset     Myocardial Infarction Father 50     Peripheral Vascular Disease Sister        SOCIAL HISTORY:  Social History     Socioeconomic History     Marital status:      Spouse name: Not on file     Number of children: Not on file     Years of education: Not on file     Highest education level: Not on file   Occupational History     Not on file   Social Needs     Financial resource strain: Not on file     Food insecurity     Worry: Not on file     Inability: Not on file     Transportation needs     Medical: Not on file     Non-medical: Not on file   Tobacco Use     Smoking status: Never Smoker     Smokeless tobacco: Former User     Types: Snuff   Substance and Sexual Activity     Alcohol use: Not on file     Drug use: Not on file     Sexual activity: Not on file   Lifestyle     Physical activity     Days per week: Not on file     Minutes per session: Not on file     Stress: Not on file   Relationships     Social connections      Talks on phone: Not on file     Gets together: Not on file     Attends Jainism service: Not on file     Active member of club or organization: Not on file     Attends meetings of clubs or organizations: Not on file     Relationship status: Not on file     Intimate partner violence     Fear of current or ex partner: Not on file     Emotionally abused: Not on file     Physically abused: Not on file     Forced sexual activity: Not on file   Other Topics Concern     Not on file   Social History Narrative     Not on file       ROS:   Constitutional: No fever, chills, or sweats. No weight gain/loss   ENT: No visual disturbance, ear ache, epistaxis, sore throat  Allergies/Immunologic: Negative.   Respiratory: No cough, hemoptysia  Cardiovascular: As per HPI  GI: No nausea, vomiting, hematemesis, melena, or hematochezia  : No urinary frequency, dysuria, or hematuria  Integument: Negative  Psychiatric: Negative  Neuro: Negative  Endocrinology: Negative   Musculoskeletal: Negative    ADDITIONAL COMMENTS:     I reviewed the patient's medications:     I reviewed the patient's pertinent clinical laboratory studies:     I reviewed the patient's pertinent imaging studies:   I reviewed the patient's ECG:       Cath 09/2020  Diagnostic Summary    * Left main is a large-caliber normal vessel. Left anterior ascending artery  is a moderate-caliber vessel and gives rise to 1 major proximal diagonal  branch.  The diagonal branch is normal. Mid LAD has mild 30% narrowing and  another  area of diffuse 70% narrowing.  It is a wrap-around LAD. Left  circumflex is a large-caliber vessel.  There is a small caliber first obtuse  marginal branch which is normal.  There is a small occluded second obtuse  marginal branch which is filled via collaterals.  The third obtuse marginal  branch is small in caliber and appears normal.  The AV groove LCx continues as  a moderate caliber distal PDA which has mild disease. Right coronary  artery  appears to be a small caliber, possibly codominant, vessel which is occluded  proximally.  Extensive collaterals seen filling the distal RCA via left to  right and right to right.    Echo Interpretation Summary    * The left ventricular systolic function is mildly decreased, estimated LVEF  45-50%.    * Left ventricular wall motion is abnormal with focal segmental hypokinesis  involving basal to mid inferior and basal inferolateral and inferoseptal  walls..    * The left ventricle is moderately enlarged.    * Normal right ventricular systolic function.    * The proximal ascending aorta is borderline dilated measuring 3.8 cm.    * No pulmonary hypertension, estimated right ventricular systolic pressure  is 29 mmHg.    * There is no hemodynamically significant valvular heart disease.    * No prior study.      Cardiac MRI 12/30/2020  Clinical history: 55-year old male with a history of multi-vessel CAD (prior LAD PCI in 09/2020 and known  OM2 and RCA chronic total occlusions) and high PVC burden. CMR to evaluate the cause of the PVCs.   Comparison CMR: None.     1. The LV is mildly enlarged with normal wall thickness. The global systolic function is mildly reduced.  The LVEF is 51%. There is mild hypokinesis of the basal and mid inferoseptum.     2. The RV is normal in cavity size. The global systolic function is normal. The RVEF is 57%.      3. Both atria are normal in size.     4. There is no significant valvular disease.      5. Late gadolinium enhancement imaging shows multiple distinct territories of subendocardial enhancement.  The first is a contiguous area of predominantly subendocardial enhancement in the basal inferoseptum, basal  inferior, basal inferolateral, and mid inferoseptal segments that is consistent with a prior RCA  infarction. The second is an area of subendocardial enhancement in the basal anterior and basal  anterolateral segments that is consistent with a prior LAD culprit infarction.  There is a third, smaller  area of remote subendocardial enhancement in mid inferolateral segments that does not appear to be  contiguous with the other territories. This may represent LCx infarction.     6. Regadenoson stress perfusion imaging shows mild mahamed-infarct ischemia in the mid inferoseptum and apical  septum. This is consistent with mahamed-infarct ischemia from the prior RCA culprit infarction.      7. There is no pericardial effusion or thickening.     8. There is no intracardiac thrombus.     CONCLUSIONS: Ischemic cardiomyopathy with prior multi-vessel infarction. There is mildly reduced LV  function and a pattern of LGE suggesting prior myocardial infarctions that predominantly involve the LAD  and RCA territories. The PVCs likely originate from one of these areas of LGE. There is also a small burden  of mahamed-infarct ischemia in the RCA territory.

## 2021-01-22 NOTE — PATIENT INSTRUCTIONS
The following is a summary of your office visit today:    No changes in medications.    1 year follow up with echo prior.    Nurse contact information: Carley Whitmore RN  Cardiology Care Coordinator  [phone] Phone      [phone] Fax      Your Cardiology Team at [facility]  RN Care Coordinator: Carley  CMA: Rajni

## 2021-01-25 NOTE — PROGRESS NOTES
[facility] Dermatology Note  Encounter Date: Jan 27, 2021  Office Visit    Dermatology Problem List:  1. SK  ___________________________________________    Assessment & Plan:  # Cherry angioma(s).    - No further intervention needed.    # Multiple clinically benign nevi, less than 100.    - No further intervention needed.     # Seborrheic keratosis, non irritated. Including spot of concern on back.   - No further intervention needed.     # Solar lentigines.   - No further intervention needed.     Procedures Performed:   None.    Follow-up: 2 year(s) in-person, or earlier for new or changing lesions    Staff and Scribe:     Scribe Disclosure:   I, Phong Hunter, am serving as a scribe to document services personally performed by Laurence Newman PA-C, based on data collection and the provider's statements to me.    Provider Disclosure:   The documentation recorded by the scribe accurately reflects the services I personally performed and the decisions made by me.    All risks, benefits and alternatives were discussed with patient.  Patient is in agreement and understands the assessment and plan.  All questions were answered.    Laurence Newman PA-C, [facility]  [facility] [place]: Phone: [phone], Fax: [phone]  [facility]: Phone: [phone],  Fax: [phone]  ____________________________________________    CC: Skin Check (FBSE, no personal or family hx of NMSC, spot check: lesion on back, not immunosuppressed)    HPI:  Mr. Jerrod Khan is a(n) 55 year old male who presents today as a return patient for a skin check    Last seen in a virtual visit on 12/02/2020 when a tan asymmetrical macule was noted on the left back. Patient instructed to follow up for evaluation and potential biopsy.    Today, he presents to have the spot on his left back rechecked. He has no other spots he is concerned about. He  does use sunscreen regularly No personal or family history of skin cancer.    Patient is otherwise feeling well, without additional concerns.    Labs:  NA    Physical Exam:  Vitals: There were no vitals taken for this visit.  SKIN: Total skin excluding the undergarment areas was performed. The exam included the head/face, neck, both arms, chest, back, abdomen, both legs, digits, buttock and/or nails.   - Lindsey Type I  - There are dome shaped bright red papules on the trunk and extremities.   - Multiple regular brown pigmented macules and papules are identified on the trunk, less than 100 nevi  - Scattered brown macules on sun exposed areas.  - There are waxy stuck on tan to brown papules on the left back.   - No other lesions of concern on areas examined.     Medications:  Current Outpatient Medications   Medication     amLODIPine (NORVASC) 10 MG tablet     aspirin (ASA) 81 MG EC tablet     atorvastatin (LIPITOR) 40 MG tablet     cholecalciferol (VITAMIN D3) 25 mcg (1000 units) capsule     clopidogrel (PLAVIX) 75 MG tablet     lisinopril (ZESTRIL) 10 MG tablet     metoprolol succinate ER (TOPROL-XL) 25 MG 24 hr tablet     nitroGLYcerin (NITROSTAT) 0.4 MG sublingual tablet     sildenafil (VIAGRA) 100 MG tablet     No current facility-administered medications for this visit.       Past Medical/Surgical History:   There is no problem list on file for this patient.    Past Medical History:   Diagnosis Date     Benign essential hypertension      Coronary artery disease involving native coronary artery of native heart without angina pectoris     PCI to LAD 09/2020     Ischemic cardiomyopathy      PVC's (premature ventricular contractions)         CC Dr. Nelson on close of this encounter.

## 2021-01-27 ENCOUNTER — OFFICE VISIT (OUTPATIENT)
Dept: DERMATOLOGY | Facility: CLINIC | Age: 56
End: 2021-01-27
Payer: COMMERCIAL

## 2021-01-27 DIAGNOSIS — D18.01 CHERRY ANGIOMA: ICD-10-CM

## 2021-01-27 DIAGNOSIS — L82.1 SEBORRHEIC KERATOSES: Primary | ICD-10-CM

## 2021-01-27 DIAGNOSIS — D22.9 MULTIPLE BENIGN NEVI: ICD-10-CM

## 2021-01-27 DIAGNOSIS — L81.4 SOLAR LENTIGO: ICD-10-CM

## 2021-01-27 PROCEDURE — 99212 OFFICE O/P EST SF 10 MIN: CPT | Performed by: PHYSICIAN ASSISTANT

## 2021-01-27 ASSESSMENT — PAIN SCALES - GENERAL: PAINLEVEL: NO PAIN (0)

## 2021-01-27 NOTE — NURSING NOTE
Jerrod Khan's goals for this visit include:   Chief Complaint   Patient presents with     Skin Check     FBSE, no personal or family hx of NMSC, spot check: lesion on back, not immunosuppressed     He requests these members of his care team be copied on today's visit information: Yes    PCP: Bernard Sosa    Referring Provider:  No referring provider defined for this encounter.    There were no vitals taken for this visit.    Do you need any medication refills at today's visit? No    Anuja Valderrama LPN

## 2021-01-27 NOTE — LETTER
1/27/2021         RE: Jerrod Khan  [address] [address]        Dear Colleague,    Thank you for referring your patient, Jerrod Khan, to the [facility]. Please see a copy of my visit note below.    [facility] Dermatology Note  Encounter Date: Jan 27, 2021  Office Visit    Dermatology Problem List:  1. SK  ___________________________________________    Assessment & Plan:  # Cherry angioma(s).    - No further intervention needed.    # Multiple clinically benign nevi, less than 100.    - No further intervention needed.     # Seborrheic keratosis, non irritated. Including spot of concern on back.   - No further intervention needed.     # Solar lentigines.   - No further intervention needed.     Procedures Performed:   None.    Follow-up: 2 year(s) in-person, or earlier for new or changing lesions    Staff and Scribe:     Scribe Disclosure:   I, Phong Hunter, am serving as a scribe to document services personally performed by Laurence Newman PA-C, based on data collection and the provider's statements to me.    Provider Disclosure:   The documentation recorded by the scribe accurately reflects the services I personally performed and the decisions made by me.    All risks, benefits and alternatives were discussed with patient.  Patient is in agreement and understands the assessment and plan.  All questions were answered.    Laurence Newman PA-C, [facility]  [facility]: Phone: [phone], Fax: [phone]  [facility]: Phone: [phone],  Fax: [phone]  ____________________________________________    CC: Skin Check (FBSE, no personal or family hx of NMSC, spot check: lesion on back, not immunosuppressed)    HPI:  Mr. Jerrod Khan is a(n) 55 year old male who presents today as a return patient for a skin check    Last seen in a  virtual visit on 12/02/2020 when a tan asymmetrical macule was noted on the left back. Patient instructed to follow up for evaluation and potential biopsy.    Today, he presents to have the spot on his left back rechecked. He has no other spots he is concerned about. He does use sunscreen regularly No personal or family history of skin cancer.    Patient is otherwise feeling well, without additional concerns.    Labs:  NA    Physical Exam:  Vitals: There were no vitals taken for this visit.  SKIN: Total skin excluding the undergarment areas was performed. The exam included the head/face, neck, both arms, chest, back, abdomen, both legs, digits, buttock and/or nails.   - Lindsey Type I  - There are dome shaped bright red papules on the trunk and extremities.   - Multiple regular brown pigmented macules and papules are identified on the trunk, less than 100 nevi  - Scattered brown macules on sun exposed areas.  - There are waxy stuck on tan to brown papules on the left back.   - No other lesions of concern on areas examined.     Medications:  Current Outpatient Medications   Medication     amLODIPine (NORVASC) 10 MG tablet     aspirin (ASA) 81 MG EC tablet     atorvastatin (LIPITOR) 40 MG tablet     cholecalciferol (VITAMIN D3) 25 mcg (1000 units) capsule     clopidogrel (PLAVIX) 75 MG tablet     lisinopril (ZESTRIL) 10 MG tablet     metoprolol succinate ER (TOPROL-XL) 25 MG 24 hr tablet     nitroGLYcerin (NITROSTAT) 0.4 MG sublingual tablet     sildenafil (VIAGRA) 100 MG tablet     No current facility-administered medications for this visit.       Past Medical/Surgical History:   There is no problem list on file for this patient.    Past Medical History:   Diagnosis Date     Benign essential hypertension      Coronary artery disease involving native coronary artery of native heart without angina pectoris     PCI to LAD 09/2020     Ischemic cardiomyopathy      PVC's (premature ventricular contractions)         CC  Dr. Nelson on close of this encounter.      Again, thank you for allowing me to participate in the care of your patient.        Sincerely,        Laurence Newman PA-C

## 2021-08-10 DIAGNOSIS — I25.5 ISCHEMIC CARDIOMYOPATHY: Primary | ICD-10-CM

## 2021-08-10 RX ORDER — METOPROLOL SUCCINATE 25 MG/1
25 TABLET, EXTENDED RELEASE ORAL DAILY
Qty: 90 TABLET | Refills: 1 | Status: SHIPPED | OUTPATIENT
Start: 2021-08-10 | End: 2021-09-27

## 2021-08-10 NOTE — TELEPHONE ENCOUNTER
"Requested Prescriptions   Pending Prescriptions Disp Refills     metoprolol succinate ER (TOPROL-XL) 25 MG 24 hr tablet 90 tablet 1     Sig: Take 1 tablet (25 mg) by mouth daily       Beta-Blockers Protocol Failed - 8/10/2021  3:53 PM        Failed - Recent (12 mo) or future (30 days) visit within the authorizing provider's specialty     Patient has had an office visit with the authorizing provider or a provider within the authorizing providers department within the previous 12 mos or has a future within next 30 days. See \"Patient Info\" tab in inbasket, or \"Choose Columns\" in Meds & Orders section of the refill encounter.              Passed - Blood pressure under 140/90 in past 12 months     BP Readings from Last 3 Encounters:   01/22/21 124/69   12/30/20 122/67   11/13/20 (!) 162/74                 Passed - Patient is age 6 or older        Passed - Medication is active on med list          Appointment with Dr Jimenez on 1/22/21, recommended follow up in one year with echocardiogram  Refilled.  Patient notified  JANET Koroma    "

## 2021-08-10 NOTE — TELEPHONE ENCOUNTER
Pt is calling.    Needing a refill of metoprolol.  New script. Has not been prescribed his current cardiologist before.    [facility] [place] on [address].     Alla Salgado RN  [facility] Nurse Advisor  8/10/2021 at 3:49 PM

## 2021-09-01 DIAGNOSIS — I10 HTN (HYPERTENSION): Primary | ICD-10-CM

## 2021-09-01 RX ORDER — AMLODIPINE BESYLATE 10 MG/1
TABLET ORAL
Qty: 90 TABLET | Refills: 1 | Status: SHIPPED | OUTPATIENT
Start: 2021-09-01 | End: 2023-02-24

## 2021-09-01 NOTE — TELEPHONE ENCOUNTER
"Requested Prescriptions   Pending Prescriptions Disp Refills     amLODIPine (NORVASC) 10 MG tablet 90 tablet 1     Sig: Take one tablet by mouth daily       Calcium Channel Blockers Protocol  Passed - 9/1/2021  3:42 PM        Passed - Blood pressure under 140/90 in past 12 months     BP Readings from Last 3 Encounters:   01/22/21 124/69   12/30/20 122/67   11/13/20 (!) 162/74                 Passed - Recent (12 mo) or future (30 days) visit within the authorizing provider's specialty     Patient has had an office visit with the authorizing provider or a provider within the authorizing providers department within the previous 12 mos or has a future within next 30 days. See \"Patient Info\" tab in inbasket, or \"Choose Columns\" in Meds & Orders section of the refill encounter.              Passed - Medication is active on med list        Passed - Patient is age 18 or older        Passed - Normal serum creatinine on file in past 12 months     Recent Labs   Lab Test 11/20/20  1448   CR 0.83       Ok to refill medication if creatinine is low           Cardiology refill protocol passed  JANET Koroma    "

## 2021-09-01 NOTE — TELEPHONE ENCOUNTER
"[facility]    Phone Message    May a detailed message be left on voicemail: yes     Reason for Call: Medication Refill Request    Needing amlodipine 10mg tab refilled.    Requesting a \"blanket renewal\" to las until January so he doesn't have to keep calling for renewals on the refill. Please call to confirm how many refills he will have sent to pharmacy.    Uses [facility] pharmacy.     Action Taken: Message routed to:  Adult Clinics: Cardiology p [id number]    Travel Screening: Not Applicable                                                                      "

## 2021-09-05 ENCOUNTER — HEALTH MAINTENANCE LETTER (OUTPATIENT)
Age: 56
End: 2021-09-05

## 2021-09-09 DIAGNOSIS — I25.10 CORONARY ARTERY DISEASE INVOLVING NATIVE CORONARY ARTERY OF NATIVE HEART WITHOUT ANGINA PECTORIS: Primary | ICD-10-CM

## 2021-09-09 RX ORDER — ATORVASTATIN CALCIUM 40 MG/1
40 TABLET, FILM COATED ORAL DAILY
Qty: 90 TABLET | Refills: 3 | Status: SHIPPED | OUTPATIENT
Start: 2021-09-09 | End: 2023-02-24

## 2021-09-09 NOTE — TELEPHONE ENCOUNTER
Pending Prescriptions:                       Disp   Refills    atorvastatin (LIPITOR) 40 MG tablet       90 tab*3            Sig: Take 1 tablet (40 mg) by mouth daily    Last Visit: 1/22/21  Next Visit: Jan 2022  Last Prescribed: Not by our clinic     Bonita Johnson RN   Cardiology Care Coordinator  [facility]   Phone: [phone]  Fax: [phone]

## 2021-09-09 NOTE — TELEPHONE ENCOUNTER
[facility] Call Center    Phone Message    May a detailed message be left on voicemail: no     Reason for Call: Medication Refill Request    Has the patient contacted the pharmacy for the refill? Yes   Name of medication being requested: atorvastatin (LIPITOR) 40 MG tablet, Clopidogrel 75mg.       Provider who prescribed the medication: Barbara  Pharmacy: [facility] [address] [address] [address].    Date medication is needed: now.  Pt had appt in Jan 2021.  Has next next appt 2022         Action Taken: Message routed to:  Adult Clinics: Cardiology p [id number]    Travel Screening: Not Applicable

## 2021-09-27 ENCOUNTER — TELEPHONE (OUTPATIENT)
Dept: CARDIOLOGY | Facility: CLINIC | Age: 56
End: 2021-09-27

## 2021-09-27 DIAGNOSIS — I25.5 ISCHEMIC CARDIOMYOPATHY: ICD-10-CM

## 2021-09-27 DIAGNOSIS — I25.10 CORONARY ARTERY DISEASE INVOLVING NATIVE CORONARY ARTERY OF NATIVE HEART WITHOUT ANGINA PECTORIS: Primary | ICD-10-CM

## 2021-09-27 RX ORDER — CLOPIDOGREL BISULFATE 75 MG/1
75 TABLET ORAL DAILY
Qty: 90 TABLET | Refills: 2 | Status: SHIPPED | OUTPATIENT
Start: 2021-09-27 | End: 2023-02-24

## 2021-09-27 RX ORDER — METOPROLOL SUCCINATE 25 MG/1
25 TABLET, EXTENDED RELEASE ORAL DAILY
Qty: 90 TABLET | Refills: 1 | Status: SHIPPED | OUTPATIENT
Start: 2021-09-27 | End: 2023-02-24

## 2021-09-27 NOTE — TELEPHONE ENCOUNTER
Barbara, MD Myranda Quintanilla, Carley VAUGHAN RN; P [facility] Cardiology Adult [place]  Caller: Unspecified (Today,  8:19 AM)  Toprol should be once daily. He should resume plavix and continue till his next appt with me.       Refills sent in to the pharmacy. Called and spoke to patient. Reviewed Dr Jimenez's response above. Asked him to pay attention to any new symptoms when he cuts back his Toprol and call if needed.     Carley Whitmore RN  Medical Speciality Care Coordinator  [facility]  Phone: [phone]

## 2021-09-27 NOTE — TELEPHONE ENCOUNTER
[facility] Call Center    Phone Message    May a detailed message be left on voicemail: no     Reason for Call: Medication Question or concern regarding medication   Prescription Clarification  Name of Medication: metoprolol succinate ER (TOPROL-XL) 25 MG 24 hr tablet  Prescribing Provider: Omkar Easley MD   Pharmacy:   [facility] [address] [address] [address]   What on the order needs clarification?   Refill request. Should Jerrod be taking 1 or 2 tabs daily? Also, should he still be taking the Rx Plavix?          Action Taken: Message routed to:  [facility] Cardiology    Travel Screening: Not Applicable

## 2021-09-27 NOTE — TELEPHONE ENCOUNTER
Called and spoke to patient. He states he ran out of Plavix two weeks ago. States he requested a refill of it that same time he requested metoprolol refill (8/10/1)   He also states he has been taking the Toprol twice a day since his first visit with Dr Jimenez for his PVC's. Nothing documented that I can find   His BP's run 100's/50's and pulse in the 50's. No dizziness or lightheadedness.   Advised I will route to Dr Jimenez for clarification on Toprol dosing and if he should restart the Plavix or not.     Carley Whitmore, JANET  Medical Speciality Care Coordinator  [facility] [place], [place]  Phone: [phone]

## 2021-11-02 DIAGNOSIS — I25.5 ISCHEMIC CARDIOMYOPATHY: ICD-10-CM

## 2021-11-05 RX ORDER — LISINOPRIL 10 MG/1
10 TABLET ORAL DAILY
Qty: 90 TABLET | Refills: 0 | Status: SHIPPED | OUTPATIENT
Start: 2021-11-05 | End: 2022-02-03

## 2021-11-05 NOTE — TELEPHONE ENCOUNTER
Last Clinic Visit: 1/22/2021  [facility]  Scheduled for follow-up 1/21/22  90 day flakita refill sent to the pharmacy -

## 2021-12-26 ENCOUNTER — HEALTH MAINTENANCE LETTER (OUTPATIENT)
Age: 56
End: 2021-12-26

## 2022-01-21 ENCOUNTER — ANCILLARY PROCEDURE (OUTPATIENT)
Dept: CARDIOLOGY | Facility: CLINIC | Age: 57
End: 2022-01-21
Attending: INTERNAL MEDICINE
Payer: COMMERCIAL

## 2022-01-21 DIAGNOSIS — I25.5 ISCHEMIC CARDIOMYOPATHY: ICD-10-CM

## 2022-01-21 DIAGNOSIS — I49.3 PVC'S (PREMATURE VENTRICULAR CONTRACTIONS): ICD-10-CM

## 2022-01-21 LAB — LVEF ECHO: NORMAL

## 2022-01-21 PROCEDURE — 93306 TTE W/DOPPLER COMPLETE: CPT | Performed by: INTERNAL MEDICINE

## 2022-02-03 DIAGNOSIS — I25.5 ISCHEMIC CARDIOMYOPATHY: ICD-10-CM

## 2022-02-03 RX ORDER — LISINOPRIL 10 MG/1
10 TABLET ORAL DAILY
Qty: 90 TABLET | Refills: 0 | Status: SHIPPED | OUTPATIENT
Start: 2022-02-03 | End: 2022-05-04

## 2022-02-03 NOTE — TELEPHONE ENCOUNTER
lisinopril (ZESTRIL) 10 MG tablet      Last Written Prescription Date:  11/5/21  Last Fill Quantity: 90,   # refills: 0  Last Office Visit : Omkar Jimenez MD  Cardiovascular Disease  1/22/2021  [facility] [place]  Recommended 1 year follow up  Future Office visit:  2/25/22    Routing refill request to provider for review/approval because:  Blood pressure outdated  BP Readings from Last 3 Encounters:   01/22/21 124/69   12/30/20 122/67   11/13/20 (!) 162/74       Overdue for labs  Lab Test 11/20/20  1448   CR 0.83     Lab Test 11/20/20  1448   POTASSIUM 4.2       90 day refill provided per protocol, routed to clinic to follow up on labs  Nothing more recent in care everywhere nor media  No future orders in queue

## 2022-02-04 ENCOUNTER — TELEPHONE (OUTPATIENT)
Dept: CARDIOLOGY | Facility: CLINIC | Age: 57
End: 2022-02-04
Payer: COMMERCIAL

## 2022-02-04 NOTE — TELEPHONE ENCOUNTER
Called patient to schedule upcoming Echo before Dr Jenkins visit. Phone continued to ring. Was unable to leave a message.     Fortino Molina, EMT  Clinic Support  [facility]    [phone]    Employed by [employer]

## 2022-02-04 NOTE — TELEPHONE ENCOUNTER
Called patient to schedule echocardiogram. Patient advised he had just had one in January. Confirmed with Dr Maldonado nurse Carley Whitmore that this would be sufficient for his visit in February.     Fortino Molina, EMT  Clinic Support  [facility]    [phone]    Employed by [employer]

## 2022-02-25 ENCOUNTER — OFFICE VISIT (OUTPATIENT)
Dept: CARDIOLOGY | Facility: CLINIC | Age: 57
End: 2022-02-25
Payer: COMMERCIAL

## 2022-02-25 VITALS
WEIGHT: 161.9 LBS | DIASTOLIC BLOOD PRESSURE: 64 MMHG | SYSTOLIC BLOOD PRESSURE: 128 MMHG | HEIGHT: 70 IN | BODY MASS INDEX: 23.18 KG/M2 | HEART RATE: 61 BPM | OXYGEN SATURATION: 98 %

## 2022-02-25 DIAGNOSIS — I25.5 ISCHEMIC CARDIOMYOPATHY: Primary | ICD-10-CM

## 2022-02-25 DIAGNOSIS — I49.3 PVC'S (PREMATURE VENTRICULAR CONTRACTIONS): ICD-10-CM

## 2022-02-25 PROCEDURE — 99214 OFFICE O/P EST MOD 30 MIN: CPT | Mod: GC | Performed by: INTERNAL MEDICINE

## 2022-02-25 NOTE — PATIENT INSTRUCTIONS
Can stop plavix end of September, 2022.   Follow up in 1 year with echo    Primary care at [facility] - Gisela Blackman or Alexi

## 2022-02-25 NOTE — PROGRESS NOTES
SUBJECTIVE:  56 year old male with a PMHx significant for CAD s/p PCI LENY mLAD in 2020, HTN and HLD comes in as part of routine cardiovascular care.     Has been doing well. Very active with cross country skiing, lifting weights, [employer] without any shortness of breath or chest pain. Denies headache, fever, chills, nights sweats, sore throat, rhinorrhea, cough, vision changes,  palpitations, abdominal pain, nausea, vomiting, diarrhea, constipation, melena, hematochezia, dysuria, hematuria, PND, orthopnea, presyncope, syncope, focal tingling or weakness.     Does have occasional bruising related to his [employer] work and was wondering about stopping DAPT.   Echo done a few months ago was reviewed by us.  It shows mildly reduced LV function with LVEF 50 to 55% that is unchanged from prior.    Current Outpatient Medications   Medication     amLODIPine (NORVASC) 10 MG tablet     aspirin (ASA) 81 MG EC tablet     atorvastatin (LIPITOR) 40 MG tablet     cholecalciferol (VITAMIN D3) 25 mcg (1000 units) capsule     clopidogrel (PLAVIX) 75 MG tablet     lisinopril (ZESTRIL) 10 MG tablet     metoprolol succinate ER (TOPROL-XL) 25 MG 24 hr tablet     nitroGLYcerin (NITROSTAT) 0.4 MG sublingual tablet     sildenafil (VIAGRA) 100 MG tablet     No current facility-administered medications for this visit.        ROS:   Negative unless in HPI.     OBJECTIVE:    There were no vitals taken for this visit.   Wt Readings from Last 1 Encounters:   11/13/20 83.9 kg (185 lb)     General: appears comfortable, alert and articulate  Head: normocephalic, atraumatic  Eyes: anicteric sclera, EOMI  Neck: no adenopathy  Orophyarynx: moist mucosa, no lesions, dentition intact  Heart: regular, normal S1/S2, no murmur, gallop, rub, estimated JVP 5cm  Lungs: clear to auscultation bilaterally, no rales or wheezing  Abdomen: soft, non-tender, bowel sounds present, no hepatosplenomegaly  Extremities: no clubbing, cyanosis or  edema  Neurological: normal speech and affect, no gross motor deficits         Lab Results   Component Value Date     11/20/2020    Lab Results   Component Value Date    CHLORIDE 104 11/20/2020    Lab Results   Component Value Date    BUN 12 11/20/2020      Lab Results   Component Value Date    POTASSIUM 4.2 11/20/2020    Lab Results   Component Value Date    CO2 32 11/20/2020    Lab Results   Component Value Date    CR 0.83 11/20/2020        Recent Labs   Lab Test 11/20/20  1448   CHOL 136   HDL 79   LDL 45   TRIG 60         Coronary angiogram 09/10/20  Left main is a large-caliber normal vessel.     Left anterior ascending artery is a moderate-caliber vessel and gives rise to 1 major proximal diagonal branch.  The diagonal branch is normal. Mid LAD has mild 30% narrowing and   another  area of diffuse 70% narrowing.  It is a wrap-around LAD.     Left circumflex is a large-caliber vessel.  There is a small caliber first obtuse marginal branch which is normal.  There is a small occluded second obtuse marginal branch which is filled via collaterals.  The third obtuse marginal branch is small in caliber and appears normal.  The AV groove LCx continues as a moderate caliber distal PDA which has mild disease.     Right coronary artery appears to be a small caliber, possibly codominant, vessel which is occluded proximally.  Extensive collaterals seen filling the distal RCA via left to right and right to right.     Right coronary artery and the second obtuse marginal branch are not suitable interventional targets. FFR was performed across the mid LAD and it was highly abnormal and reached 0.60 within 30 seconds. PCI was performed to the mid LAD lesion with a single drug-eluting stent.  Excellent result was obtained without any complications.      Echocardiogram 01/22/22  Interpretation Summary     Left ventricular function is decreased. The ejection fraction is 50-55%  (borderline).  Global right ventricular  function is normal.  No significant valvular abnormalities present.  The inferior vena cava was normal in size with preserved respiratory  variability.  No pericardial effusion is present.      ASSESSMENT/PLAN:  1.  Multivessel CAD, PCI to mid LAD 9/2020,  of OM 2 and proximal RCA, mildly reduced LV function: No angina or CHF.   Continue aspirin lifelong, Plavix for 2 years (can stop 9/2022).  Continue Lipitor 40, goal LDL less than 70  2.  Mild ischemic cardiomyopathy, LVEF 50% (stress CMR - small multivessel MIs, mild RCA ischemia): Continue Toprol 25 mg.  Continue lisinopril 10 mg and amlodipine 10 mg  3.  High PVC burden of 25%, asymptomatic, most likely scar-mediated, no acute interventions required currently. Pt's resting HR is in the high 50's (maximally beta-blocked for now), thus will refrain from uptitrating metoprolol for now.   4.  Hypertension: now controlled, has BP cuff at home, home BP log is within ideal range     Abdulkadir Clayton MD PhD  Cardiology Fellow     Pt was seen and plan of care discussed with Dr. Jimenez.     [facility] Cardiovascular Division    I have seen and examined the patient, reviewed labs and tests. I have discussed my findings and treatment recommendations with the house staff and/or Cardiology fellow and agree with their assessment and plan as outlined in the note.      Omkar Jimenez MD    Cardiac Imaging and Prevention  [facility]  Pager: [phone]

## 2022-02-25 NOTE — PROGRESS NOTES
"Jerrod Jan Khan's goals for this visit include: If he could get rid of the blood thinner or find some way to avoid the bruising. Talk about a new primary care provider.    He requests these members of his care team be copied on today's visit information: PCP    PCP: Bernard Sosa    Referring Provider:  Referred Self  No address on file    /64 (BP Location: Left arm, Patient Position: Sitting, Cuff Size: Adult Regular)   Pulse 61   Ht 1.78 m (5' 10.08\")   Wt 73.4 kg (161 lb 14.4 oz)   SpO2 98%   BMI 23.18 kg/m      Do you need any medication refills at today's visit? No.    Fortino Molina, EMT  Clinic Support  [facility]    [phone]    Employed by [employer] [facility]        "

## 2022-05-04 DIAGNOSIS — I25.10 CORONARY ARTERY DISEASE INVOLVING NATIVE CORONARY ARTERY OF NATIVE HEART WITHOUT ANGINA PECTORIS: Primary | ICD-10-CM

## 2022-05-04 DIAGNOSIS — I25.5 ISCHEMIC CARDIOMYOPATHY: ICD-10-CM

## 2022-05-04 DIAGNOSIS — I10 HTN (HYPERTENSION): ICD-10-CM

## 2022-05-04 NOTE — TELEPHONE ENCOUNTER
"Requested Prescriptions   Pending Prescriptions Disp Refills     lisinopril (ZESTRIL) 10 MG tablet 90 tablet 3     Sig: Take 1 tablet (10 mg) by mouth daily       ACE Inhibitors (Including Combos) Protocol Failed - 5/4/2022  2:22 PM        Failed - Normal serum creatinine on file in past 12 months     Recent Labs   Lab Test 11/20/20  1448   CR 0.83       Ok to refill medication if creatinine is low          Failed - Normal serum potassium on file in past 12 months     Recent Labs   Lab Test 11/20/20  1448   POTASSIUM 4.2             Passed - Blood pressure under 140/90 in past 12 months     BP Readings from Last 3 Encounters:   02/25/22 128/64   01/22/21 124/69   12/30/20 122/67                 Passed - Recent (12 mo) or future (30 days) visit within the authorizing provider's specialty     Patient has had an office visit with the authorizing provider or a provider within the authorizing providers department within the previous 12 mos or has a future within next 30 days. See \"Patient Info\" tab in inbasket, or \"Choose Columns\" in Meds & Orders section of the refill encounter.              Passed - Medication is active on med list        Passed - Patient is age 18 or older             Routing refill request to provider for review/approval because:  Labs out of range: creat and potassium    JANET Koroma      "

## 2022-05-04 NOTE — TELEPHONE ENCOUNTER
lisinopril (ZESTRIL) 10 MG tablet   Last Written Prescription Date:  2/3/2022  Last Fill Quantity: 90,   # refills: 0  Last Office Visit : 2/25/22  Future Office visit:  2/17/2023    Routing refill request to provider for review/approval because:  Labs past due Cr K last completed  11/20/20. Last appt 2/25/22, next 2/17/23

## 2022-05-05 NOTE — TELEPHONE ENCOUNTER
Omkar Jimenez MD  You 6 hours ago (9:19 AM)     Needs labs first - please order cbc/bmp/flp.     Message text       You  Omkar Jimenez MD; [facility] Cardiology Adult [place] Yesterday (3:36 PM)     FAiled refill protocol, no recent labs.    Please refill or send back with recommendations.   Carley      Lab orders placed. Will set up lab appt.     Carley Whitmore RN  Cardiology Care Coordinator  [facility]  Phone: [phone]

## 2022-05-05 NOTE — TELEPHONE ENCOUNTER
Scheduled patient for fasting labs 05/09/22 at 8:50am.      Jayla HARRIS MA   Cardiology Team  [facility]

## 2022-05-09 ENCOUNTER — LAB (OUTPATIENT)
Dept: LAB | Facility: CLINIC | Age: 57
End: 2022-05-09
Payer: COMMERCIAL

## 2022-05-09 DIAGNOSIS — I25.10 CORONARY ARTERY DISEASE INVOLVING NATIVE CORONARY ARTERY OF NATIVE HEART WITHOUT ANGINA PECTORIS: ICD-10-CM

## 2022-05-09 DIAGNOSIS — I25.5 ISCHEMIC CARDIOMYOPATHY: ICD-10-CM

## 2022-05-09 DIAGNOSIS — I10 HTN (HYPERTENSION): ICD-10-CM

## 2022-05-09 LAB
ANION GAP SERPL CALCULATED.3IONS-SCNC: 5 MMOL/L (ref 3–14)
BUN SERPL-MCNC: 14 MG/DL (ref 7–30)
CALCIUM SERPL-MCNC: 9.1 MG/DL (ref 8.5–10.1)
CHLORIDE BLD-SCNC: 106 MMOL/L (ref 94–109)
CHOLEST SERPL-MCNC: 155 MG/DL
CO2 SERPL-SCNC: 26 MMOL/L (ref 20–32)
CREAT SERPL-MCNC: 0.75 MG/DL (ref 0.66–1.25)
ERYTHROCYTE [DISTWIDTH] IN BLOOD BY AUTOMATED COUNT: 13.3 % (ref 10–15)
FASTING STATUS PATIENT QL REPORTED: YES
GFR SERPL CREATININE-BSD FRML MDRD: >90 ML/MIN/1.73M2
GLUCOSE BLD-MCNC: 116 MG/DL (ref 70–99)
HCT VFR BLD AUTO: 41.8 % (ref 40–53)
HDLC SERPL-MCNC: 115 MG/DL
HGB BLD-MCNC: 14.1 G/DL (ref 13.3–17.7)
LDLC SERPL CALC-MCNC: 34 MG/DL
MCH RBC QN AUTO: 30.7 PG (ref 26.5–33)
MCHC RBC AUTO-ENTMCNC: 33.7 G/DL (ref 31.5–36.5)
MCV RBC AUTO: 91 FL (ref 78–100)
NONHDLC SERPL-MCNC: 40 MG/DL
PLATELET # BLD AUTO: 368 10E3/UL (ref 150–450)
POTASSIUM BLD-SCNC: 4.8 MMOL/L (ref 3.4–5.3)
RBC # BLD AUTO: 4.59 10E6/UL (ref 4.4–5.9)
SODIUM SERPL-SCNC: 137 MMOL/L (ref 133–144)
TRIGL SERPL-MCNC: 30 MG/DL
WBC # BLD AUTO: 7.4 10E3/UL (ref 4–11)

## 2022-05-09 PROCEDURE — 85027 COMPLETE CBC AUTOMATED: CPT

## 2022-05-09 PROCEDURE — 80061 LIPID PANEL: CPT

## 2022-05-09 PROCEDURE — 36415 COLL VENOUS BLD VENIPUNCTURE: CPT

## 2022-05-09 PROCEDURE — 80048 BASIC METABOLIC PNL TOTAL CA: CPT

## 2022-05-10 RX ORDER — LISINOPRIL 10 MG/1
10 TABLET ORAL DAILY
Qty: 90 TABLET | Refills: 3 | Status: SHIPPED | OUTPATIENT
Start: 2022-05-10 | End: 2023-04-29

## 2022-10-23 ENCOUNTER — HEALTH MAINTENANCE LETTER (OUTPATIENT)
Age: 57
End: 2022-10-23

## 2023-02-17 ENCOUNTER — ANCILLARY PROCEDURE (OUTPATIENT)
Dept: CARDIOLOGY | Facility: CLINIC | Age: 58
End: 2023-02-17
Attending: INTERNAL MEDICINE
Payer: COMMERCIAL

## 2023-02-17 ENCOUNTER — OFFICE VISIT (OUTPATIENT)
Dept: CARDIOLOGY | Facility: CLINIC | Age: 58
End: 2023-02-17
Payer: COMMERCIAL

## 2023-02-17 VITALS
BODY MASS INDEX: 25.37 KG/M2 | HEART RATE: 63 BPM | OXYGEN SATURATION: 100 % | WEIGHT: 177.2 LBS | DIASTOLIC BLOOD PRESSURE: 61 MMHG | SYSTOLIC BLOOD PRESSURE: 127 MMHG

## 2023-02-17 DIAGNOSIS — I49.3 PVC'S (PREMATURE VENTRICULAR CONTRACTIONS): ICD-10-CM

## 2023-02-17 DIAGNOSIS — I25.5 ISCHEMIC CARDIOMYOPATHY: Primary | ICD-10-CM

## 2023-02-17 DIAGNOSIS — I25.5 ISCHEMIC CARDIOMYOPATHY: ICD-10-CM

## 2023-02-17 LAB — LVEF ECHO: NORMAL

## 2023-02-17 PROCEDURE — 93306 TTE W/DOPPLER COMPLETE: CPT | Performed by: STUDENT IN AN ORGANIZED HEALTH CARE EDUCATION/TRAINING PROGRAM

## 2023-02-17 PROCEDURE — 99214 OFFICE O/P EST MOD 30 MIN: CPT | Performed by: INTERNAL MEDICINE

## 2023-02-17 NOTE — PROGRESS NOTES
Jerrod Khan's goals for this visit include:     He requests these members of his care team be copied on today's visit information: PCP    PCP: Bernard Sosa    Referring Provider:  No referring provider defined for this encounter.    /61 (BP Location: Left arm, Patient Position: Sitting, Cuff Size: Adult Regular)   Pulse 63   Wt 80.4 kg (177 lb 3.2 oz)   SpO2 100%   BMI 25.37 kg/m      Do you need any medication refills at today's visit? No.    Fortino Molina, EMT  Clinic Support  [facility]    [phone]    Employed by [facility] [employer]

## 2023-02-17 NOTE — PROGRESS NOTES
[facility] Cardiology Consultation:    Assessment and Plan:    1.  Multivessel CAD, PCI to mid LAD 9/2020,  of OM 2 and proximal RCA, mildly reduced LV function: No angina or CHF.   Continue aspirin and statin lifelong.  Treated with DAPT for 2 years.    2.  Mild ischemic cardiomyopathy, LVEF 50% (stress CMR - small multivessel MIs, mild RCA ischemia): Continue Toprol 25 mg and lisinopril 10 mg.  3.  High PVC burden of 25%, asymptomatic, most likely scar-mediated.   4.  Hypertension: controlled    Omkar Jimenez MD    Cardiac Imaging and Prevention  [facility]  [email or web address] Pager: [phone]    HPI: CAD routine follow-up.  He has been doing well without any intercurrent medical illness or hospitalization.  He stopped his Plavix as instructed after completing 2 years.  He is happy to have less bruising.  He is tolerating all his medications without any issues.  He is still hoping to establish with a new primary care physician.  I reviewed his echocardiogram that was done today.  It shows frequent PVCs, normal cardiac function, no concerning abnormalities, and no change from prior study.  Lipid profile checked last year shows an LDL cholesterol of 34.  Basic labs checked last year are normal.  He exercises regularly and eats a Mediterranean style diet.    EXAM:  /61 (BP Location: Left arm, Patient Position: Sitting, Cuff Size: Adult Regular)   Pulse 63   Wt 80.4 kg (177 lb 3.2 oz)   SpO2 100%   BMI 25.37 kg/m    GEN/CONSTITUIONAL: Appears comfortable, in no apparent distress   EYES: No icterus  ENT/MOUTH: Normal  JVP:  Not visible  RESPIRATORY: Clear to auscultation bilaterally   CARDIOVASCULAR: Regular S1 and S2, no murmurs, rubs, or gallops.   ABDOMEN: Soft, non-tender, positive bowel sounds   NEUROLOGIC: Grossly non-focal   PSYCHIATRIC: Normal affect  EXT: No cyanosis, clubbing, edema. Normal pedal pulses.  Skin: No petechiae, purpura or  rash    PAST MEDICAL HISTORY:  Past Medical History:   Diagnosis Date     Benign essential hypertension      Coronary artery disease involving native coronary artery of native heart without angina pectoris     PCI to LAD 09/2020     Ischemic cardiomyopathy      PVC's (premature ventricular contractions)        CURRENT MEDICATIONS:  Current Outpatient Medications   Medication     amLODIPine (NORVASC) 10 MG tablet     aspirin (ASA) 81 MG EC tablet     atorvastatin (LIPITOR) 40 MG tablet     cholecalciferol (VITAMIN D3) 25 mcg (1000 units) capsule     clopidogrel (PLAVIX) 75 MG tablet     lisinopril (ZESTRIL) 10 MG tablet     metoprolol succinate ER (TOPROL-XL) 25 MG 24 hr tablet     nitroGLYcerin (NITROSTAT) 0.4 MG sublingual tablet     sildenafil (VIAGRA) 100 MG tablet     No current facility-administered medications for this visit.       PAST SURGICAL HISTORY:  Past Surgical History:   Procedure Laterality Date     REPAIR CLEFT LIP         ALLERGIES     Allergies   Allergen Reactions     Simvastatin      Other reaction(s): Unknown  Did not feel right          FAMILY HISTORY:  Family History   Problem Relation Age of Onset     Myocardial Infarction Father 50     Peripheral Vascular Disease Sister        SOCIAL HISTORY:  Social History     Socioeconomic History     Marital status:      Spouse name: Not on file     Number of children: Not on file     Years of education: Not on file     Highest education level: Not on file   Occupational History     Not on file   Tobacco Use     Smoking status: Never     Smokeless tobacco: Former     Types: Snuff   Substance and Sexual Activity     Alcohol use: Not on file     Drug use: Not on file     Sexual activity: Not on file   Other Topics Concern     Not on file   Social History Narrative     Not on file     Social Determinants of Health     Financial Resource Strain: Not on file   Food Insecurity: Not on file   Transportation Needs: Not on file   Physical Activity: Not on  file   Stress: Not on file   Social Connections: Not on file   Intimate Partner Violence: Not on file   Housing Stability: Not on file       ROS:   Constitutional: No fever, chills, or sweats. No weight gain/loss   ENT: No visual disturbance, ear ache, epistaxis, sore throat  Allergies/Immunologic: Negative.   Respiratory: No cough, hemoptysia  Cardiovascular: As per HPI  GI: No nausea, vomiting, hematemesis, melena, or hematochezia  : No urinary frequency, dysuria, or hematuria  Integument: Negative  Psychiatric: Negative  Neuro: Negative  Endocrinology: Negative   Musculoskeletal: Negative    ADDITIONAL COMMENTS:     I reviewed the patient's medications:     I reviewed the patient's pertinent clinical laboratory studies:     I reviewed the patient's pertinent imaging studies:   I reviewed the patient's ECG:

## 2023-02-17 NOTE — PATIENT INSTRUCTIONS
No change  RTC 1 year with echo    Primary care at [facility] - Hernan Allison, Gisela Escobedo or Alexi

## 2023-02-24 DIAGNOSIS — I25.10 CORONARY ARTERY DISEASE INVOLVING NATIVE CORONARY ARTERY OF NATIVE HEART WITHOUT ANGINA PECTORIS: ICD-10-CM

## 2023-02-24 DIAGNOSIS — I25.5 ISCHEMIC CARDIOMYOPATHY: ICD-10-CM

## 2023-02-24 DIAGNOSIS — I10 HTN (HYPERTENSION): ICD-10-CM

## 2023-02-24 RX ORDER — METOPROLOL SUCCINATE 25 MG/1
25 TABLET, EXTENDED RELEASE ORAL DAILY
Qty: 90 TABLET | Refills: 3 | Status: SHIPPED | OUTPATIENT
Start: 2023-02-24 | End: 2024-02-02

## 2023-02-24 RX ORDER — AMLODIPINE BESYLATE 10 MG/1
TABLET ORAL
Qty: 90 TABLET | Refills: 3 | Status: SHIPPED | OUTPATIENT
Start: 2023-02-24 | End: 2024-01-30

## 2023-02-24 RX ORDER — ATORVASTATIN CALCIUM 40 MG/1
40 TABLET, FILM COATED ORAL DAILY
Qty: 90 TABLET | Refills: 3 | Status: SHIPPED | OUTPATIENT
Start: 2023-02-24 | End: 2024-02-02

## 2023-02-24 RX ORDER — CLOPIDOGREL BISULFATE 75 MG/1
75 TABLET ORAL DAILY
Qty: 90 TABLET | Refills: 3 | Status: SHIPPED | OUTPATIENT
Start: 2023-02-24 | End: 2024-02-02

## 2023-02-24 NOTE — TELEPHONE ENCOUNTER
"Requested Prescriptions   Pending Prescriptions Disp Refills     amLODIPine (NORVASC) 10 MG tablet 90 tablet 0     Sig: Take one tablet by mouth daily       Calcium Channel Blockers Protocol  Passed - 2/24/2023  1:18 PM        Passed - Blood pressure under 140/90 in past 12 months     BP Readings from Last 3 Encounters:   02/17/23 127/61   02/25/22 128/64   01/22/21 124/69                 Passed - Recent (12 mo) or future (30 days) visit within the authorizing provider's specialty     Patient has had an office visit with the authorizing provider or a provider within the authorizing providers department within the previous 12 mos or has a future within next 30 days. See \"Patient Info\" tab in inbasket, or \"Choose Columns\" in Meds & Orders section of the refill encounter.              Passed - Medication is active on med list        Passed - Patient is age 18 or older        Passed - Normal serum creatinine on file in past 12 months     Recent Labs   Lab Test 05/09/22  0854   CR 0.75       Ok to refill medication if creatinine is low             atorvastatin (LIPITOR) 40 MG tablet 90 tablet 0     Sig: Take 1 tablet (40 mg) by mouth daily       Statins Protocol Passed - 2/24/2023  1:18 PM        Passed - LDL on file in past 12 months     Recent Labs   Lab Test 05/09/22  0854   LDL 34             Passed - No abnormal creatine kinase in past 12 months     No lab results found.             Passed - Recent (12 mo) or future (30 days) visit within the authorizing provider's specialty     Patient has had an office visit with the authorizing provider or a provider within the authorizing providers department within the previous 12 mos or has a future within next 30 days. See \"Patient Info\" tab in inbasket, or \"Choose Columns\" in Meds & Orders section of the refill encounter.              Passed - Medication is active on med list        Passed - Patient is age 18 or older           clopidogrel (PLAVIX) 75 MG tablet 90 tablet 0 " "    Sig: Take 1 tablet (75 mg) by mouth daily       Plavix Passed - 2/24/2023  1:18 PM        Passed - No active PPI on record unless is Protonix        Passed - Normal HGB on file in past 12 months     Recent Labs   Lab Test 05/09/22  0854   HGB 14.1               Passed - Normal Platelets on file in past 12 months     Recent Labs   Lab Test 05/09/22  0854                  Passed - Recent (12 mo) or future (30 days) visit within the authorizing provider's specialty     Patient has had an office visit with the authorizing provider or a provider within the authorizing providers department within the previous 12 mos or has a future within next 30 days. See \"Patient Info\" tab in inbasket, or \"Choose Columns\" in Meds & Orders section of the refill encounter.              Passed - Medication is active on med list        Passed - Patient is age 18 or older           metoprolol succinate ER (TOPROL XL) 25 MG 24 hr tablet 90 tablet 3     Sig: Take 1 tablet (25 mg) by mouth daily       Beta-Blockers Protocol Passed - 2/24/2023  1:18 PM        Passed - Blood pressure under 140/90 in past 12 months     BP Readings from Last 3 Encounters:   02/17/23 127/61   02/25/22 128/64   01/22/21 124/69                 Passed - Patient is age 6 or older        Passed - Recent (12 mo) or future (30 days) visit within the authorizing provider's specialty     Patient has had an office visit with the authorizing provider or a provider within the authorizing providers department within the previous 12 mos or has a future within next 30 days. See \"Patient Info\" tab in inbasket, or \"Choose Columns\" in Meds & Orders section of the refill encounter.              Passed - Medication is active on med list           Cardiology refill protocol passed    Carley Whitmore RN  Cardiology Care Coordinator  [facility]  Phone: [phone]    "

## 2023-02-24 NOTE — TELEPHONE ENCOUNTER
[facility] Call Center    Phone Message    May a detailed message be left on voicemail: yes     Reason for Call: Medication Refill Request    Has the patient contacted the pharmacy for the refill? Yes   Name of medication being requested: amLODIPine (NORVASC) 10 MG tablet  atorvastatin (LIPITOR) 40 MG tablet  clopidogrel (PLAVIX) 75 MG tablet  metoprolol succinate ER (TOPROL-XL) 25 MG 24 hr tablet  Provider who prescribed the medication: Dr Jimenez  Pharmacy: [place] [id number]    Date medication is needed: 2/27/23   The patient will be out on Monday       Action Taken: Other: Cardiology    Travel Screening: Not Applicable     Thank you!  [facility]

## 2023-02-24 NOTE — TELEPHONE ENCOUNTER
amLODIPine (NORVASC) 10 MG tablet      Last Written Prescription Date:  9-1-21  Last Fill Quantity: 90,   # refills: 1  atorvastatin (LIPITOR) 40 MG tablet      Last Written Prescription Date:  9-9-21  Last Fill Quantity: 90,   # refills: 3  clopidogrel (PLAVIX) 75 MG tablet      Last Written Prescription Date:  9-27-21  Last Fill Quantity: 90,   # refills: 2  metoprolol succinate ER (TOPROL XL) 25 MG 24 hr tablet      Last Written Prescription Date:  9-27-21  Last Fill Quantity: 90,   # refills: 1    Last Office Visit : 2-17-23  Future Office visit:  2-16-24    Routing refill request to provider for review/approval because:  Gap in RF      Date medication is needed: 2/27/23              The patient will be out on Monday

## 2023-04-02 ENCOUNTER — HEALTH MAINTENANCE LETTER (OUTPATIENT)
Age: 58
End: 2023-04-02

## 2023-04-11 ASSESSMENT — ENCOUNTER SYMPTOMS
NERVOUS/ANXIOUS: 0
HEARTBURN: 0
DIZZINESS: 0
ARTHRALGIAS: 0
PALPITATIONS: 0
FREQUENCY: 0
DIARRHEA: 0
CHILLS: 0
PARESTHESIAS: 0
JOINT SWELLING: 0
FEVER: 0
EYE PAIN: 0
HEADACHES: 0
SORE THROAT: 0
CONSTIPATION: 0
HEMATOCHEZIA: 0
MYALGIAS: 0
SHORTNESS OF BREATH: 0
ABDOMINAL PAIN: 0
WEAKNESS: 0
DYSURIA: 0
HEMATURIA: 0
NAUSEA: 0
COUGH: 0

## 2023-04-17 PROBLEM — I49.3 PVC'S (PREMATURE VENTRICULAR CONTRACTIONS): Status: ACTIVE | Noted: 2023-04-17

## 2023-04-17 PROBLEM — I25.5 ISCHEMIC CARDIOMYOPATHY: Status: ACTIVE | Noted: 2023-04-17

## 2023-04-17 PROBLEM — I25.10 CORONARY ARTERY DISEASE INVOLVING NATIVE CORONARY ARTERY OF NATIVE HEART WITHOUT ANGINA PECTORIS: Status: ACTIVE | Noted: 2023-04-17

## 2023-04-17 PROBLEM — I10 ESSENTIAL HYPERTENSION: Status: ACTIVE | Noted: 2023-04-17

## 2023-04-18 ENCOUNTER — OFFICE VISIT (OUTPATIENT)
Dept: FAMILY MEDICINE | Facility: CLINIC | Age: 58
End: 2023-04-18
Payer: COMMERCIAL

## 2023-04-18 VITALS
WEIGHT: 168 LBS | HEART RATE: 64 BPM | SYSTOLIC BLOOD PRESSURE: 135 MMHG | HEIGHT: 70 IN | RESPIRATION RATE: 16 BRPM | DIASTOLIC BLOOD PRESSURE: 65 MMHG | OXYGEN SATURATION: 100 % | BODY MASS INDEX: 24.05 KG/M2 | TEMPERATURE: 98.6 F

## 2023-04-18 DIAGNOSIS — Z12.5 SCREENING FOR PROSTATE CANCER: ICD-10-CM

## 2023-04-18 DIAGNOSIS — Z82.49 FAMILY HISTORY OF PREMATURE CORONARY ARTERY DISEASE: ICD-10-CM

## 2023-04-18 DIAGNOSIS — R73.01 IFG (IMPAIRED FASTING GLUCOSE): ICD-10-CM

## 2023-04-18 DIAGNOSIS — Z00.00 ANNUAL PHYSICAL EXAM: Primary | ICD-10-CM

## 2023-04-18 DIAGNOSIS — I10 ESSENTIAL HYPERTENSION: ICD-10-CM

## 2023-04-18 DIAGNOSIS — I49.3 PVC'S (PREMATURE VENTRICULAR CONTRACTIONS): ICD-10-CM

## 2023-04-18 DIAGNOSIS — N52.8 OTHER MALE ERECTILE DYSFUNCTION: ICD-10-CM

## 2023-04-18 DIAGNOSIS — E78.00 HYPERCHOLESTEROLEMIA: ICD-10-CM

## 2023-04-18 DIAGNOSIS — I25.5 ISCHEMIC CARDIOMYOPATHY: ICD-10-CM

## 2023-04-18 DIAGNOSIS — Z23 NEED FOR PNEUMOCOCCAL VACCINATION: ICD-10-CM

## 2023-04-18 DIAGNOSIS — Z23 NEED FOR HEPATITIS B VACCINATION: ICD-10-CM

## 2023-04-18 DIAGNOSIS — I25.10 CORONARY ARTERY DISEASE INVOLVING NATIVE CORONARY ARTERY OF NATIVE HEART WITHOUT ANGINA PECTORIS: ICD-10-CM

## 2023-04-18 LAB
ALBUMIN SERPL-MCNC: 4.1 G/DL (ref 3.4–5)
ALP SERPL-CCNC: 79 U/L (ref 40–150)
ALT SERPL W P-5'-P-CCNC: 35 U/L (ref 0–70)
ANION GAP SERPL CALCULATED.3IONS-SCNC: 5 MMOL/L (ref 3–14)
AST SERPL W P-5'-P-CCNC: 20 U/L (ref 0–45)
BILIRUB SERPL-MCNC: 0.8 MG/DL (ref 0.2–1.3)
BUN SERPL-MCNC: 10 MG/DL (ref 7–30)
CALCIUM SERPL-MCNC: 9.1 MG/DL (ref 8.5–10.1)
CHLORIDE BLD-SCNC: 101 MMOL/L (ref 94–109)
CHOLEST SERPL-MCNC: 170 MG/DL
CO2 SERPL-SCNC: 27 MMOL/L (ref 20–32)
CREAT SERPL-MCNC: 0.84 MG/DL (ref 0.66–1.25)
ERYTHROCYTE [DISTWIDTH] IN BLOOD BY AUTOMATED COUNT: 13 % (ref 10–15)
FASTING STATUS PATIENT QL REPORTED: YES
GFR SERPL CREATININE-BSD FRML MDRD: >90 ML/MIN/1.73M2
GLUCOSE BLD-MCNC: 99 MG/DL (ref 70–99)
HBA1C MFR BLD: 5.4 % (ref 0–5.6)
HCT VFR BLD AUTO: 43.7 % (ref 40–53)
HDLC SERPL-MCNC: 115 MG/DL
HGB BLD-MCNC: 14.5 G/DL (ref 13.3–17.7)
LDLC SERPL CALC-MCNC: 44 MG/DL
MCH RBC QN AUTO: 30.7 PG (ref 26.5–33)
MCHC RBC AUTO-ENTMCNC: 33.2 G/DL (ref 31.5–36.5)
MCV RBC AUTO: 92 FL (ref 78–100)
NONHDLC SERPL-MCNC: 55 MG/DL
PLATELET # BLD AUTO: 376 10E3/UL (ref 150–450)
POTASSIUM BLD-SCNC: 4.4 MMOL/L (ref 3.4–5.3)
PROT SERPL-MCNC: 7.6 G/DL (ref 6.8–8.8)
PSA SERPL-MCNC: 1 UG/L (ref 0–4)
RBC # BLD AUTO: 4.73 10E6/UL (ref 4.4–5.9)
SODIUM SERPL-SCNC: 133 MMOL/L (ref 133–144)
TRIGL SERPL-MCNC: 54 MG/DL
WBC # BLD AUTO: 8.9 10E3/UL (ref 4–11)

## 2023-04-18 PROCEDURE — 80053 COMPREHEN METABOLIC PANEL: CPT | Performed by: INTERNAL MEDICINE

## 2023-04-18 PROCEDURE — 99214 OFFICE O/P EST MOD 30 MIN: CPT | Mod: 25 | Performed by: INTERNAL MEDICINE

## 2023-04-18 PROCEDURE — 90472 IMMUNIZATION ADMIN EACH ADD: CPT | Performed by: INTERNAL MEDICINE

## 2023-04-18 PROCEDURE — 80061 LIPID PANEL: CPT | Performed by: INTERNAL MEDICINE

## 2023-04-18 PROCEDURE — G0103 PSA SCREENING: HCPCS | Performed by: INTERNAL MEDICINE

## 2023-04-18 PROCEDURE — 36415 COLL VENOUS BLD VENIPUNCTURE: CPT | Performed by: INTERNAL MEDICINE

## 2023-04-18 PROCEDURE — 90677 PCV20 VACCINE IM: CPT | Performed by: INTERNAL MEDICINE

## 2023-04-18 PROCEDURE — 90471 IMMUNIZATION ADMIN: CPT | Performed by: INTERNAL MEDICINE

## 2023-04-18 PROCEDURE — 85027 COMPLETE CBC AUTOMATED: CPT | Performed by: INTERNAL MEDICINE

## 2023-04-18 PROCEDURE — 83036 HEMOGLOBIN GLYCOSYLATED A1C: CPT | Performed by: INTERNAL MEDICINE

## 2023-04-18 PROCEDURE — 99386 PREV VISIT NEW AGE 40-64: CPT | Mod: 25 | Performed by: INTERNAL MEDICINE

## 2023-04-18 PROCEDURE — 90746 HEPB VACCINE 3 DOSE ADULT IM: CPT | Performed by: INTERNAL MEDICINE

## 2023-04-18 ASSESSMENT — ENCOUNTER SYMPTOMS
CHILLS: 0
CONSTIPATION: 0
COUGH: 0
ABDOMINAL PAIN: 0
ARTHRALGIAS: 0
FEVER: 0
DIZZINESS: 0
DIARRHEA: 0
NERVOUS/ANXIOUS: 0
HEARTBURN: 0
JOINT SWELLING: 0
HEMATOCHEZIA: 0
WEAKNESS: 0
SHORTNESS OF BREATH: 0
HEADACHES: 0
PALPITATIONS: 0
NAUSEA: 0
DYSURIA: 0
MYALGIAS: 0
FREQUENCY: 0
HEMATURIA: 0
SORE THROAT: 0
EYE PAIN: 0
PARESTHESIAS: 0

## 2023-04-18 ASSESSMENT — PAIN SCALES - GENERAL: PAINLEVEL: NO PAIN (0)

## 2023-04-18 NOTE — NURSING NOTE
"Prior to immunization administration, verified patients identity using patient s name and date of birth. Please see Immunization Activity for additional information.      Screening Questionnaire for Adult Immunization    Are you sick today?   { :370609::\"No\"}   Do you have allergies to medications, food, a vaccine component or latex?   { :167094::\"No\"}   Have you ever had a serious reaction after receiving a vaccination?   { :776765::\"No\"}   Do you have a long-term health problem with heart, lung, kidney, or metabolic disease (e.g., diabetes), asthma, a blood disorder, no spleen, complement component deficiency, a cochlear implant, or a spinal fluid leak?  Are you on long-term aspirin therapy?   { :696703::\"No\"}   Do you have cancer, leukemia, HIV/AIDS, or any other immune system problem?   { :656771::\"No\"}   Do you have a parent, brother, or sister with an immune system problem?   { :146863::\"No\"}   In the past 3 months, have you taken medications that affect  your immune system, such as prednisone, other steroids, or anticancer drugs; drugs for the treatment of rheumatoid arthritis, Crohn s disease, or psoriasis; or have you had radiation treatments?   { :328476::\"No\"}   Have you had a seizure, or a brain or other nervous system problem?   { :148837::\"No\"}   During the past year, have you received a transfusion of blood or blood    products, or been given immune (gamma) globulin or antiviral drug?   { :578958::\"No\"}   For women: Are you pregnant or is there a chance you could become       pregnant during the next month?   { :171879::\"No\"}   Have you received any vaccinations in the past 4 weeks?   { :551956::\"No\"}     Immunization questionnaire { :575278::\"answers were all negative.\"}      Injection of *** given by Diane L. Schoenherr, RN. Patient instructed to remain in clinic for 15 minutes afterwards, and to report any adverse reactions.     Screening performed by Diane L. Schoenherr, RN on 4/18/2023 at 2:56 " PM.

## 2023-04-18 NOTE — NURSING NOTE
Prior to immunization administration, verified patients identity using patient s name and date of birth. Please see Immunization Activity for additional information. Yes    Screening Questionnaire for Adult Immunization    Are you sick today?   No   Do you have allergies to medications, food, a vaccine component or latex?   No   Have you ever had a serious reaction after receiving a vaccination?   No   Do you have a long-term health problem with heart, lung, kidney, or metabolic disease (e.g., diabetes), asthma, a blood disorder, no spleen, complement component deficiency, a cochlear implant, or a spinal fluid leak?  Are you on long-term aspirin therapy?   Yes   Do you have cancer, leukemia, HIV/AIDS, or any other immune system problem?   No   Do you have a parent, brother, or sister with an immune system problem?   Yes   In the past 3 months, have you taken medications that affect  your immune system, such as prednisone, other steroids, or anticancer drugs; drugs for the treatment of rheumatoid arthritis, Crohn s disease, or psoriasis; or have you had radiation treatments?   No   Have you had a seizure, or a brain or other nervous system problem?   No   During the past year, have you received a transfusion of blood or blood    products, or been given immune (gamma) globulin or antiviral drug?   No   For women: Are you pregnant or is there a chance you could become       pregnant during the next month?   N/A   Have you received any vaccinations in the past 4 weeks?   No     Immunization questionnaire was positive for at least one answer.  Notified MD Alexi.      Injection of Hep B and Prevnar 20 given by Diane L. Schoenherr, RN. Patient instructed to remain in clinic for 15 minutes afterwards, and to report any adverse reactions.     Screening performed by Diane L. Schoenherr, RN on 4/18/2023 at 3:10 PM.

## 2023-04-18 NOTE — PROGRESS NOTES
SUBJECTIVE:   CC: Jerrod is an 57 year old who presents for preventative health visit.     57-year-old gentleman comes in to [facility] care and for a annual checkup.  He has a known history of coronary disease, hypertension, hyperlipidemia and strong family history of premature coronary disease.  The patient is under care of cardiologist, Dr. Jimenez.    The patient offers no concerns or complaint.  He maintains his weight and has a BMI of 24.  He eats a Mediterranean diet.  He exercises daily and does both cardio and weight resistant exercise.  He does not smoke and does not abuse alcohol.        4/18/2023     1:51 PM   Additional Questions   Roomed by Diane Lynne Schoenherr RN   Patient has been advised of split billing requirements and indicates understanding: Yes  Healthy Habits:     Getting at least 3 servings of Calcium per day:  Yes    Bi-annual eye exam:  Yes    Dental care twice a year:  NO    Sleep apnea or symptoms of sleep apnea:  None    Diet:  Other    Frequency of exercise:  6-7 days/week    Duration of exercise:  45-60 minutes    Taking medications regularly:  Yes    Medication side effects:  None    PHQ-2 Total Score: 0    Additional concerns today:  No      Today's PHQ-2 Score:       4/11/2023     3:53 PM   PHQ-2 ( 1999 [employer])   Q1: Little interest or pleasure in doing things 0   Q2: Feeling down, depressed or hopeless 0   PHQ-2 Score 0   Q1: Little interest or pleasure in doing things Not at all   Q2: Feeling down, depressed or hopeless Not at all   PHQ-2 Score 0       Have you ever done Advance Care Planning? (For example, a Health Directive, POLST, or a discussion with a medical provider or your loved ones about your wishes): Yes, advance care planning is on file.     Social History     Tobacco Use     Smoking status: Never     Smokeless tobacco: Former     Types: Snuff   Vaping Use     Vaping status: Never Used   Substance Use Topics     Alcohol use: Not on file             4/11/2023     3:53 PM    Alcohol Use   Prescreen: >3 drinks/day or >7 drinks/week? No       Last PSA: No results found for: PSA    Reviewed orders with patient. Reviewed health maintenance and updated orders accordingly - Yes  BP Readings from Last 3 Encounters:   23 135/65   23 127/61   22 128/64    Wt Readings from Last 3 Encounters:   23 76.2 kg (168 lb)   23 80.4 kg (177 lb 3.2 oz)   22 73.4 kg (161 lb 14.4 oz)                  Patient Active Problem List   Diagnosis     Ischemic cardiomyopathy     Coronary artery disease involving native coronary artery of native heart without angina pectoris     PVC's (premature ventricular contractions)     Essential hypertension     IFG (impaired fasting glucose)     Other male erectile dysfunction     Hypercholesterolemia     Past Surgical History:   Procedure Laterality Date     COLONOSCOPY       REPAIR CLEFT LIP         Social History     Tobacco Use     Smoking status: Never     Smokeless tobacco: Former     Types: Snuff   Vaping Use     Vaping status: Never Used   Substance Use Topics     Alcohol use: Yes     Alcohol/week: 5.0 standard drinks of alcohol     Types: 5 Cans of beer per week     Family History   Problem Relation Age of Onset     Kidney Disease Mother 82     Heart Failure Mother      Pacemaker Mother      Myocardial Infarction Father 50     Peripheral Vascular Disease Father 78         OF [place]     Coronary Artery Disease Sister 56        CABG and stents     Myocardial Infarction Maternal Grandmother 52     Myocardial Infarction Paternal Grandmother 42     Myocardial Infarction Paternal Grandfather 63         Current Outpatient Medications   Medication Sig Dispense Refill     amLODIPine (NORVASC) 10 MG tablet Take one tablet by mouth daily 90 tablet 3     aspirin (ASA) 81 MG EC tablet Take 81 mg by mouth       atorvastatin (LIPITOR) 40 MG tablet Take 1 tablet (40 mg) by mouth daily 90 tablet 3     cholecalciferol (VITAMIN D3) 25 mcg (1000  units) capsule Take 1 capsule by mouth daily       lisinopril (ZESTRIL) 10 MG tablet Take 1 tablet (10 mg) by mouth daily 90 tablet 3     metoprolol succinate ER (TOPROL XL) 25 MG 24 hr tablet Take 1 tablet (25 mg) by mouth daily 90 tablet 3     nitroGLYcerin (NITROSTAT) 0.4 MG sublingual tablet PLEASE SEE ATTACHED FOR DETAILED DIRECTIONS       sildenafil (VIAGRA) 100 MG tablet Take 100 mg by mouth Take as needed       clopidogrel (PLAVIX) 75 MG tablet Take 1 tablet (75 mg) by mouth daily (Patient not taking: Reported on 4/18/2023) 90 tablet 3     Allergies   Allergen Reactions     Simvastatin      Other reaction(s): Unknown  Did not feel right          Reviewed and updated as needed this visit by clinical staff   Tobacco                Reviewed and updated as needed this visit by Provider                 Past Medical History:   Diagnosis Date     Benign essential hypertension      Congestive heart failure (H) 08/2021    I was told I had heart disease in August 2021     Coronary artery disease involving native coronary artery of native heart without angina pectoris 04/17/2023     Essential hypertension 04/17/2023     IFG (impaired fasting glucose) 04/18/2023     Ischemic cardiomyopathy      Other male erectile dysfunction 04/18/2023     PVC's (premature ventricular contractions) 04/17/2023     S/P coronary artery stent placement 09/2020    mild LAD PCI+LENY      Past Surgical History:   Procedure Laterality Date     COLONOSCOPY       REPAIR CLEFT LIP         Review of Systems   Constitutional: Negative for chills and fever.   HENT: Negative for congestion, ear pain, hearing loss and sore throat.    Eyes: Negative for pain and visual disturbance.   Respiratory: Negative for cough and shortness of breath.    Cardiovascular: Negative for chest pain, palpitations and peripheral edema.   Gastrointestinal: Negative for abdominal pain, constipation, diarrhea, heartburn, hematochezia and nausea.   Genitourinary: Positive  for impotence. Negative for dysuria, frequency, genital sores, hematuria, penile discharge and urgency.   Musculoskeletal: Negative for arthralgias, joint swelling and myalgias.   Skin: Negative for rash.   Neurological: Negative for dizziness, weakness, headaches and paresthesias.   Psychiatric/Behavioral: Negative for mood changes. The patient is not nervous/anxious.          OBJECTIVE:   There were no vitals taken for this visit.    Physical Exam  GENERAL: healthy, alert and no distress  EYES: Eyes grossly normal to inspection, PERRL and conjunctivae and sclerae normal  HENT: ear canals and TM's normal, nose and mouth without ulcers or lesions  NECK: no adenopathy, no asymmetry, masses, or scars and thyroid normal to palpation  RESP: lungs clear to auscultation - no rales, rhonchi or wheezes  CV: regular rate and rhythm, normal S1 S2, no S3 or S4, no murmur, click or rub, no peripheral edema and peripheral pulses strong  ABDOMEN: soft, nontender, no hepatosplenomegaly, no masses and bowel sounds normal   (male): normal male genitalia without lesions or urethral discharge, no hernia  RECTAL: normal sphincter tone, no rectal masses, prostate normal size, smooth, nontender without nodules or masses  MS: no gross musculoskeletal defects noted, no edema  SKIN: no suspicious lesions or rashes  NEURO: Normal strength and tone, mentation intact and speech normal  PSYCH: mentation appears normal, affect normal/bright  LYMPH: no cervical, supraclavicular, axillary, or inguinal adenopathy        ASSESSMENT/PLAN:     1.  Annual physical exam completed  2.  Coronary artery disease-stable.  Status post PCI+ LENY to 70% mid LAD lesion on 9/10/2020.  Has proximally occluded RCA filled by collateral and occluded second OMB fills by collateral.  Medically managed.  Patient on aspirin and low-dose metoprolol.  3.  Essential hypertension well-controlled with combination of amlodipine lisinopril and metoprolol.  4.  Ischemic  cardiomyopathy with most recent left ventricular function looking good with EF around 50%.  Patient maintained on beta-blocker and ACE.  5.  Hypercholesterolemia been treated with atorvastatin 40 mg a day.  His LDL goal is met at less than 70.  We will be checking his lipids.  6.  PVCs frequent with a burden of about 25%.  Asymptomatic.  Patient indicated with exercise his PVCs to get suppressed.  7.  Impaired fasting glucose.  I will be checking fasting blood sugar as well as A1c  8.  Other male erectile dysfunction.  On sildenafil as needed.  9.  Family history of premature coronary artery disease.    I will be checking CBC, CMP A1c lipids and screening PSA for prostate cancer.  I will get back to the results.  He was provided with Pneumovax 20 Valent today.  If lab looks good he will return for follow-up in 1 year.      Patient has been advised of split billing requirements and indicates understanding: Yes      COUNSELING:   Reviewed preventive health counseling, as reflected in patient instructions       Regular exercise       Healthy diet/nutrition       Vision screening        He reports that he has never smoked. He has quit using smokeless tobacco.  His smokeless tobacco use included snuff.        Hamzah Truong MD  [facility]

## 2023-04-26 DIAGNOSIS — I25.5 ISCHEMIC CARDIOMYOPATHY: ICD-10-CM

## 2023-04-29 RX ORDER — LISINOPRIL 10 MG/1
10 TABLET ORAL DAILY
Qty: 90 TABLET | Refills: 3 | Status: SHIPPED | OUTPATIENT
Start: 2023-04-29 | End: 2024-02-02

## 2023-04-29 NOTE — TELEPHONE ENCOUNTER
LISINOPRIL 10 MG TABLET  Last Written Prescription Date:   5/10/2022  Last Fill Quantity: 90,   # refills: 3  Last Office Visit :  2/17/2023  Future Office visit:   2/16/2024  90 Tabs, 3 Refills sent to tin Parada RN  Central Triage Red Flags/Med Refills

## 2023-05-22 ENCOUNTER — ALLIED HEALTH/NURSE VISIT (OUTPATIENT)
Dept: FAMILY MEDICINE | Facility: CLINIC | Age: 58
End: 2023-05-22
Payer: COMMERCIAL

## 2023-05-22 DIAGNOSIS — Z23 ENCOUNTER FOR IMMUNIZATION: Primary | ICD-10-CM

## 2023-05-22 PROCEDURE — 90471 IMMUNIZATION ADMIN: CPT

## 2023-05-22 PROCEDURE — 90746 HEPB VACCINE 3 DOSE ADULT IM: CPT

## 2023-05-22 PROCEDURE — 99207 PR NO CHARGE NURSE ONLY: CPT

## 2023-05-22 NOTE — PROGRESS NOTES
Prior to immunization administration, verified patients identity using patient s name and date of birth. Please see Immunization Activity for additional information.     Screening Questionnaire for Adult Immunization    Are you sick today?   No   Do you have allergies to medications, food, a vaccine component or latex?   No   Have you ever had a serious reaction after receiving a vaccination?   No   Do you have a long-term health problem with heart, lung, kidney, or metabolic disease (e.g., diabetes), asthma, a blood disorder, no spleen, complement component deficiency, a cochlear implant, or a spinal fluid leak?  Are you on long-term aspirin therapy?   No   Do you have cancer, leukemia, HIV/AIDS, or any other immune system problem?   No   Do you have a parent, brother, or sister with an immune system problem?   No   In the past 3 months, have you taken medications that affect  your immune system, such as prednisone, other steroids, or anticancer drugs; drugs for the treatment of rheumatoid arthritis, Crohn s disease, or psoriasis; or have you had radiation treatments?   No   Have you had a seizure, or a brain or other nervous system problem?   No   During the past year, have you received a transfusion of blood or blood    products, or been given immune (gamma) globulin or antiviral drug?   No   For women: Are you pregnant or is there a chance you could become       pregnant during the next month?   No   Have you received any vaccinations in the past 4 weeks?   No     Immunization questionnaire answers were all negative.    I have reviewed the following standing orders:   This patient is due and qualifies for the Hepatitis B vaccine.    Click here for Hepatitis B Standing Order    I have reviewed the vaccines inclusion and exclusion criteria; No concerns regarding eligibility.         Injection of Hep given by Katia Payan MA. Patient instructed to remain in clinic for 15 minutes afterwards, and to report any  adverse reactions.     Screening performed by Katia Payan MA on 5/22/2023 at 7:12 AM.

## 2023-11-20 ENCOUNTER — ALLIED HEALTH/NURSE VISIT (OUTPATIENT)
Dept: FAMILY MEDICINE | Facility: CLINIC | Age: 58
End: 2023-11-20
Payer: COMMERCIAL

## 2023-11-20 DIAGNOSIS — Z23 ENCOUNTER FOR IMMUNIZATION: Primary | ICD-10-CM

## 2023-11-20 PROCEDURE — 99207 PR NO CHARGE NURSE ONLY: CPT

## 2023-11-20 PROCEDURE — 90746 HEPB VACCINE 3 DOSE ADULT IM: CPT

## 2023-11-20 PROCEDURE — 90471 IMMUNIZATION ADMIN: CPT

## 2023-11-20 NOTE — PROGRESS NOTES
Prior to immunization administration, verified patients identity using patient s name and date of birth. Please see Immunization Activity for additional information.     Screening Questionnaire for Adult Immunization    Are you sick today?   No   Do you have allergies to medications, food, a vaccine component or latex?   No   Have you ever had a serious reaction after receiving a vaccination?   No   Do you have a long-term health problem with heart, lung, kidney, or metabolic disease (e.g., diabetes), asthma, a blood disorder, no spleen, complement component deficiency, a cochlear implant, or a spinal fluid leak?  Are you on long-term aspirin therapy?   No   Do you have cancer, leukemia, HIV/AIDS, or any other immune system problem?   No   Do you have a parent, brother, or sister with an immune system problem?   No   In the past 3 months, have you taken medications that affect  your immune system, such as prednisone, other steroids, or anticancer drugs; drugs for the treatment of rheumatoid arthritis, Crohn s disease, or psoriasis; or have you had radiation treatments?   No   Have you had a seizure, or a brain or other nervous system problem?   No   During the past year, have you received a transfusion of blood or blood    products, or been given immune (gamma) globulin or antiviral drug?   No   For women: Are you pregnant or is there a chance you could become       pregnant during the next month?   No   Have you received any vaccinations in the past 4 weeks?   No     Immunization questionnaire answers were all negative.    I have reviewed the following standing orders:   This patient is due and qualifies for the Hepatitis B vaccine.    Click here for Hepatitis B Standing Order    I have reviewed the vaccines inclusion and exclusion criteria; No concerns regarding eligibility.     Patient instructed to remain in clinic for 15 minutes afterwards, and to report any adverse reactions.     Screening performed by Dee  SIENA Smiht on 11/20/2023 at 3:35 PM.

## 2024-01-25 DIAGNOSIS — I10 HTN (HYPERTENSION): ICD-10-CM

## 2024-01-29 NOTE — TELEPHONE ENCOUNTER
amLODIPine (NORVASC) 10 MG tablet   Last Written Prescription Date:  2/24/2023  Last Fill Quantity: 90,   # refills: 3  Last Office Visit : 2/17/2023  [place]  Future Office visit:  2/2/2024    Routing refill request to provider for review/approval because:  Refill needs review- appointment within the 4 days.

## 2024-01-30 RX ORDER — AMLODIPINE BESYLATE 10 MG/1
10 TABLET ORAL DAILY
Qty: 90 TABLET | Refills: 0 | Status: SHIPPED | OUTPATIENT
Start: 2024-01-30 | End: 2024-02-02

## 2024-01-30 NOTE — PROGRESS NOTES
[facility] Cardiology Consultation:    Assessment and Plan:    1.  Multivessel CAD, PCI to mid LAD 9/2020,  of OM 2 and proximal RCA, mildly reduced LV function: No angina or CHF.   Continue aspirin and statin lifelong.  Treated with DAPT for 2 years.    2.  Mild ischemic cardiomyopathy, LVEF 50% (stress CMR - small multivessel MIs, mild RCA ischemia): Continue Toprol 25 mg and lisinopril 10 mg.  3.  High PVC burden of 25%, asymptomatic, most likely scar-mediated.   4.  Hypertension: controlled    Omkar Jimenez MD    Cardiac Imaging and Prevention  [facility]  [email or web address] Pager: [phone]    HPI: CAD routine follow-up.  He has been doing well without any intercurrent medical illness or hospitalization. He is tolerating all his medications without any issues.    I reviewed his echocardiogram that was done today.  It shows frequent PVCs, normal cardiac function, no concerning abnormalities, and no change from prior study.  Lipid profile checked last year shows an LDL cholesterol of 44.  Basic labs checked last year are normal.  He exercises regularly and eats a Mediterranean style diet.  Denies any chest pain or pressure, shortness of breath/dyspnea, orthopnea, pnd, palpitations, syncope/presyncope or edema.    EXAM:  /77 (BP Location: Right arm, Patient Position: Chair, Cuff Size: Adult Regular)   Wt 81 kg (178 lb 9.6 oz)   BMI 25.63 kg/m    GEN/CONSTITUIONAL: Appears comfortable, in no apparent distress   EYES: No icterus  ENT/MOUTH: Normal  JVP:  Not visible  RESPIRATORY: Clear to auscultation bilaterally   CARDIOVASCULAR: Regular S1 and S2, no murmurs, rubs, or gallops.   ABDOMEN: Soft, non-tender, positive bowel sounds   NEUROLOGIC: Grossly non-focal   PSYCHIATRIC: Normal affect  EXT: No cyanosis, clubbing, edema. Normal pedal pulses.  Skin: No petechiae, purpura or rash    PAST MEDICAL HISTORY:  Past Medical History:   Diagnosis Date    Benign  essential hypertension     Congestive heart failure (H) 2021    I was told I had heart disease in 2021    Coronary artery disease involving native coronary artery of native heart without angina pectoris 2023    Essential hypertension 2023    Family history of premature coronary artery disease 2023    IFG (impaired fasting glucose) 2023    Ischemic cardiomyopathy     Other male erectile dysfunction 2023    PVC's (premature ventricular contractions) 2023    S/P coronary artery stent placement 2020    mild LAD PCI+LENY       CURRENT MEDICATIONS:  Current Outpatient Medications   Medication    amLODIPine (NORVASC) 10 MG tablet    aspirin (ASA) 81 MG EC tablet    atorvastatin (LIPITOR) 40 MG tablet    cholecalciferol (VITAMIN D3) 25 mcg (1000 units) capsule    clopidogrel (PLAVIX) 75 MG tablet    lisinopril (ZESTRIL) 10 MG tablet    metoprolol succinate ER (TOPROL XL) 25 MG 24 hr tablet    nitroGLYcerin (NITROSTAT) 0.4 MG sublingual tablet    sildenafil (VIAGRA) 100 MG tablet     No current facility-administered medications for this visit.       PAST SURGICAL HISTORY:  Past Surgical History:   Procedure Laterality Date    COLONOSCOPY      CV CORONARY ANGIOGRAM Left 09/10/2022    PCI+ LENY to 70% mid LAD lesion.  Occluded second OMB filled with collateral, proximal occlusion of RCA filled with collateral    REPAIR CLEFT LIP         ALLERGIES     Allergies   Allergen Reactions    Simvastatin      Other reaction(s): Unknown  Did not feel right          FAMILY HISTORY:  Family History   Problem Relation Age of Onset    Kidney Disease Mother 82    Heart Failure Mother     Pacemaker Mother     Myocardial Infarction Father 50    Peripheral Vascular Disease Father 78         OF [place]    Coronary Artery Disease Sister 56        CABG and stents    Myocardial Infarction Maternal Grandmother 52    Myocardial Infarction Paternal Grandmother 42    Myocardial Infarction Paternal  Grandfather 63       SOCIAL HISTORY:  Social History     Socioeconomic History    Marital status:      Spouse name: Not on file    Number of children: Not on file    Years of education: Not on file    Highest education level: Not on file   Occupational History    Not on file   Tobacco Use    Smoking status: Never    Smokeless tobacco: Former     Types: Snuff   Vaping Use    Vaping Use: Never used   Substance and Sexual Activity    Alcohol use: Yes     Alcohol/week: 5.0 standard drinks of alcohol     Types: 5 Cans of beer per week    Drug use: Never    Sexual activity: Yes     Partners: Female   Other Topics Concern    Not on file   Social History Narrative    Not on file     Social Determinants of Health     Financial Resource Strain: Not on file   Food Insecurity: Not on file   Transportation Needs: Not on file   Physical Activity: Not on file   Stress: Not on file   Social Connections: Not on file   Interpersonal Safety: Not on file   Housing Stability: Not on file       ROS:   Constitutional: No fever, chills, or sweats. No weight gain/loss   ENT: No visual disturbance, ear ache, epistaxis, sore throat  Allergies/Immunologic: Negative.   Respiratory: No cough, hemoptysia  Cardiovascular: As per HPI  GI: No nausea, vomiting, hematemesis, melena, or hematochezia  : No urinary frequency, dysuria, or hematuria  Integument: Negative  Psychiatric: Negative  Neuro: Negative  Endocrinology: Negative   Musculoskeletal: Negative    ADDITIONAL COMMENTS:     I reviewed the patient's medications:     I reviewed the patient's pertinent clinical laboratory studies:     I reviewed the patient's pertinent imaging studies:   I reviewed the patient's ECG:

## 2024-01-30 NOTE — TELEPHONE ENCOUNTER
Refill sent.     Requested Prescriptions   Pending Prescriptions Disp Refills    amLODIPine (NORVASC) 10 MG tablet [Pharmacy Med Name: amLODIPine Besylate Oral Tablet 10 MG] 90 tablet 3     Sig: Take 1 tablet (10 mg) by mouth daily       Calcium Channel Blockers Protocol  Passed - 1/29/2024  5:03 PM        Passed - Blood pressure under 140/90 in past 12 months     BP Readings from Last 3 Encounters:   04/18/23 135/65   02/17/23 127/61   02/25/22 128/64                 Passed - Recent (12 mo) or future (30 days) visit within the authorizing provider's specialty     The patient must have completed an in-person or virtual visit within the past 12 months or has a future visit scheduled within the next 90 days with the authorizing provider s specialty.  Urgent care and e-visits do not quality as an office visit for this protocol.          Passed - Medication is active on med list        Passed - Patient is age 18 or older        Passed - Normal serum creatinine on file in past 12 months     Recent Labs   Lab Test 04/18/23  1513   CR 0.84       Ok to refill medication if creatinine is low

## 2024-02-02 ENCOUNTER — OFFICE VISIT (OUTPATIENT)
Dept: CARDIOLOGY | Facility: CLINIC | Age: 59
End: 2024-02-02
Payer: COMMERCIAL

## 2024-02-02 ENCOUNTER — ANCILLARY PROCEDURE (OUTPATIENT)
Dept: CARDIOLOGY | Facility: CLINIC | Age: 59
End: 2024-02-02
Attending: INTERNAL MEDICINE
Payer: COMMERCIAL

## 2024-02-02 VITALS
WEIGHT: 178.6 LBS | DIASTOLIC BLOOD PRESSURE: 82 MMHG | SYSTOLIC BLOOD PRESSURE: 142 MMHG | BODY MASS INDEX: 25.63 KG/M2 | HEART RATE: 64 BPM

## 2024-02-02 DIAGNOSIS — I25.5 ISCHEMIC CARDIOMYOPATHY: ICD-10-CM

## 2024-02-02 DIAGNOSIS — I10 PRIMARY HYPERTENSION: ICD-10-CM

## 2024-02-02 DIAGNOSIS — I25.10 CORONARY ARTERY DISEASE INVOLVING NATIVE CORONARY ARTERY OF NATIVE HEART WITHOUT ANGINA PECTORIS: ICD-10-CM

## 2024-02-02 DIAGNOSIS — I49.3 PVC'S (PREMATURE VENTRICULAR CONTRACTIONS): Primary | ICD-10-CM

## 2024-02-02 DIAGNOSIS — I49.3 PVC'S (PREMATURE VENTRICULAR CONTRACTIONS): ICD-10-CM

## 2024-02-02 LAB — LVEF ECHO: NORMAL

## 2024-02-02 PROCEDURE — 93306 TTE W/DOPPLER COMPLETE: CPT | Performed by: INTERNAL MEDICINE

## 2024-02-02 PROCEDURE — 99214 OFFICE O/P EST MOD 30 MIN: CPT | Performed by: INTERNAL MEDICINE

## 2024-02-02 RX ORDER — ATORVASTATIN CALCIUM 40 MG/1
40 TABLET, FILM COATED ORAL DAILY
Qty: 90 TABLET | Refills: 3 | Status: SHIPPED | OUTPATIENT
Start: 2024-02-02

## 2024-02-02 RX ORDER — METOPROLOL SUCCINATE 25 MG/1
25 TABLET, EXTENDED RELEASE ORAL DAILY
Qty: 90 TABLET | Refills: 3 | Status: SHIPPED | OUTPATIENT
Start: 2024-02-02

## 2024-02-02 RX ORDER — LISINOPRIL 10 MG/1
10 TABLET ORAL DAILY
Qty: 90 TABLET | Refills: 3 | Status: SHIPPED | OUTPATIENT
Start: 2024-02-02

## 2024-02-02 RX ORDER — AMLODIPINE BESYLATE 10 MG/1
10 TABLET ORAL DAILY
Qty: 90 TABLET | Refills: 3 | Status: SHIPPED | OUTPATIENT
Start: 2024-02-02

## 2024-02-02 NOTE — NURSING NOTE
"No chief complaint on file.      Initial /77 (BP Location: Right arm, Patient Position: Chair, Cuff Size: Adult Regular)   Wt 81 kg (178 lb 9.6 oz)   BMI 25.63 kg/m   Estimated body mass index is 25.63 kg/m  as calculated from the following:    Height as of 4/18/23: 1.778 m (5' 10\").    Weight as of this encounter: 81 kg (178 lb 9.6 oz)..  BP completed using cuff size: regular    Che THRASHER, VF   "

## 2024-02-02 NOTE — NURSING NOTE
Cardiac Testing:   -Complete an echocardiogram in 1 year     Med Reconcile: Reviewed and verified all current medications with the patient. The updated medication list was printed and given to the patient./No medication changes.       Return Appointment  -Follow-up in 1 year with Dr Brock

## 2024-02-02 NOTE — PATIENT INSTRUCTIONS
Take your medicines every day, as directed     Changes made today:  No medication changes       Cardiology Care Coordinators:      Mallory ADKINS RN     Cardiology Rooming staff:  Che COOMBS CNA    Phone  [phone]      Fax [phone]    To Contact us     During Business Hours:  [phone]     If you are needing refills please contact your pharmacy.     For urgent after hour care please call the [facility] at [phone] or the [facility] at [phone] and ask to speak to the cardiologist on call.            HOW TO CHECK YOUR BLOOD PRESSURE AT HOME:     Avoid eating, smoking, and exercising for at least 30 minutes before taking a reading.     Be sure you have taken your BP medication at least 2-3 hours before you check it.      Sit quietly for 10 minutes before a reading.      Sit in a chair with your feet flat on the floor. Rest your  arm on a table so that the arm cuff is at the same level as your heart.     Remain still during the reading.  Record your blood pressure and pulse in a log and bring to your next appointment.       Use [employer] allows you to communicate directly with your heart team through secure messaging.  [employer] can be accessed any time on your phone, computer, or tablet.  If you need assistance, we'd be happy to help!             Keep your Heart Appointments:     Follow-up in  1 year with echo and Dr Brock

## 2024-04-22 ENCOUNTER — OFFICE VISIT (OUTPATIENT)
Dept: FAMILY MEDICINE | Facility: CLINIC | Age: 59
End: 2024-04-22
Payer: COMMERCIAL

## 2024-04-22 VITALS
HEART RATE: 71 BPM | WEIGHT: 181.4 LBS | DIASTOLIC BLOOD PRESSURE: 71 MMHG | HEIGHT: 71 IN | RESPIRATION RATE: 16 BRPM | TEMPERATURE: 98.3 F | SYSTOLIC BLOOD PRESSURE: 136 MMHG | OXYGEN SATURATION: 99 % | BODY MASS INDEX: 25.4 KG/M2

## 2024-04-22 DIAGNOSIS — Z12.5 SCREENING FOR PROSTATE CANCER: ICD-10-CM

## 2024-04-22 DIAGNOSIS — Z00.00 ROUTINE GENERAL MEDICAL EXAMINATION AT A HEALTH CARE FACILITY: Primary | ICD-10-CM

## 2024-04-22 DIAGNOSIS — Z13.1 SCREENING FOR DIABETES MELLITUS: ICD-10-CM

## 2024-04-22 DIAGNOSIS — Z13.220 SCREENING FOR HYPERLIPIDEMIA: ICD-10-CM

## 2024-04-22 LAB
ALBUMIN SERPL BCG-MCNC: 4.7 G/DL (ref 3.5–5.2)
ALP SERPL-CCNC: 86 U/L (ref 40–150)
ALT SERPL W P-5'-P-CCNC: 35 U/L (ref 0–70)
ANION GAP SERPL CALCULATED.3IONS-SCNC: 10 MMOL/L (ref 7–15)
AST SERPL W P-5'-P-CCNC: 29 U/L (ref 0–45)
BILIRUB SERPL-MCNC: 0.5 MG/DL
BUN SERPL-MCNC: 13.6 MG/DL (ref 6–20)
CALCIUM SERPL-MCNC: 9.5 MG/DL (ref 8.6–10)
CHLORIDE SERPL-SCNC: 99 MMOL/L (ref 98–107)
CHOLEST SERPL-MCNC: 185 MG/DL
CREAT SERPL-MCNC: 0.81 MG/DL (ref 0.67–1.17)
DEPRECATED HCO3 PLAS-SCNC: 27 MMOL/L (ref 22–29)
EGFRCR SERPLBLD CKD-EPI 2021: >90 ML/MIN/1.73M2
FASTING STATUS PATIENT QL REPORTED: YES
GLUCOSE SERPL-MCNC: 113 MG/DL (ref 70–99)
HBA1C MFR BLD: 5.3 % (ref 0–5.6)
HDLC SERPL-MCNC: 110 MG/DL
LDLC SERPL CALC-MCNC: 67 MG/DL
NONHDLC SERPL-MCNC: 75 MG/DL
POTASSIUM SERPL-SCNC: 4.7 MMOL/L (ref 3.4–5.3)
PROT SERPL-MCNC: 7.4 G/DL (ref 6.4–8.3)
PSA SERPL DL<=0.01 NG/ML-MCNC: 0.84 NG/ML (ref 0–3.5)
SODIUM SERPL-SCNC: 136 MMOL/L (ref 135–145)
TRIGL SERPL-MCNC: 40 MG/DL

## 2024-04-22 PROCEDURE — 83036 HEMOGLOBIN GLYCOSYLATED A1C: CPT | Performed by: INTERNAL MEDICINE

## 2024-04-22 PROCEDURE — 80053 COMPREHEN METABOLIC PANEL: CPT | Performed by: INTERNAL MEDICINE

## 2024-04-22 PROCEDURE — 99396 PREV VISIT EST AGE 40-64: CPT | Performed by: INTERNAL MEDICINE

## 2024-04-22 PROCEDURE — G0103 PSA SCREENING: HCPCS | Performed by: INTERNAL MEDICINE

## 2024-04-22 PROCEDURE — 80061 LIPID PANEL: CPT | Performed by: INTERNAL MEDICINE

## 2024-04-22 PROCEDURE — 36415 COLL VENOUS BLD VENIPUNCTURE: CPT | Performed by: INTERNAL MEDICINE

## 2024-04-22 SDOH — HEALTH STABILITY: PHYSICAL HEALTH: ON AVERAGE, HOW MANY DAYS PER WEEK DO YOU ENGAGE IN MODERATE TO STRENUOUS EXERCISE (LIKE A BRISK WALK)?: 7 DAYS

## 2024-04-22 SDOH — HEALTH STABILITY: PHYSICAL HEALTH: ON AVERAGE, HOW MANY MINUTES DO YOU ENGAGE IN EXERCISE AT THIS LEVEL?: 60 MIN

## 2024-04-22 ASSESSMENT — SOCIAL DETERMINANTS OF HEALTH (SDOH): HOW OFTEN DO YOU GET TOGETHER WITH FRIENDS OR RELATIVES?: ONCE A WEEK

## 2024-04-22 NOTE — PROGRESS NOTES
Preventive Care Visit  [facility]EM[facility] PITTMAN  Rick Escamilla MD, Internal Medicine  Apr 22, 2024      Assessment & Plan     Routine general medical examination at a health care facility  He is up-to-date with all the vaccines  He has a history of CAD and follows closely with cardiology  He had a colonoscopy in 2019 at [facility] which showed that there was a polyp and some ulcer at the ileocecal valve  Ulcer was biopsied  He was told to come back for colonoscopy in 5 years again and he did receive notification from them recently  Today we discussed about that and I offered a referral to [place] or he can schedule colonoscopy with the team who did his colonoscopy in 2019  He is going to reach out to them and schedule for colonoscopy  He was also concerned about a lesion on the back which according to his wife was getting bigger  He denies any itching or bleeding  Examination did show that there is a raised erythematous lesion with a central ulceration  My concern here would be if it something like BCC  I discussed with him about sending a referral to [place] [facility] for a expedited visit  He told me that he would like to go to [facility] [facility] and will reach out to them  Told him to call me if he needs a referral and I am happy to fax that  - Comprehensive metabolic panel (BMP + Alb, Alk Phos, ALT, AST, Total. Bili, TP); Future  - Comprehensive metabolic panel (BMP + Alb, Alk Phos, ALT, AST, Total. Bili, TP)    Screening for diabetes mellitus    - Hemoglobin A1c; Future  - Hemoglobin A1c    Screening for prostate cancer    - PSA, screen; Future  - PSA, screen    Screening for hyperlipidemia    - Lipid panel reflex to direct LDL Non-fasting; Future  - Lipid panel reflex to direct LDL Non-fasting    Patient has been advised of split billing requirements and indicates understanding: No    25 minutes spent by me on the date of the encounter doing chart review, history and exam,  "documentation and further activities per the note      BMI  Estimated body mass index is 25.3 kg/m  as calculated from the following:    Height as of this encounter: 1.803 m (5' 11\").    Weight as of this encounter: 82.3 kg (181 lb 6.4 oz).   Weight management plan: Discussed healthy diet and exercise guidelines    Counseling  Appropriate preventive services were discussed with this patient, including applicable screening as appropriate for fall prevention, nutrition, physical activity, Tobacco-use cessation, weight loss and cognition.  Checklist reviewing preventive services available has been given to the patient.  Reviewed patient's diet, addressing concerns and/or questions.   The patient was instructed to see the dentist every 6 months.   He is at risk for psychosocial distress and has been provided with information to reduce risk.           Heaven Elder is a 58 year old, presenting for the following:  Physical        4/22/2024     8:19 AM   Additional Questions   Roomed by Lilibeth        Health Care Directive  Patient does not have a Health Care Directive or Living Will: Discussed advance care planning with patient; however, patient declined at this time.    HPI  Here  for an annual physical            4/22/2024   General Health   How would you rate your overall physical health? Good   Feel stress (tense, anxious, or unable to sleep) Only a little   (!) STRESS CONCERN      4/22/2024   Nutrition   Three or more servings of calcium each day? Yes   Diet: Low salt   How many servings of fruit and vegetables per day? 4 or more   How many sweetened beverages each day? 0-1         4/22/2024   Exercise   Days per week of moderate/strenous exercise 7 days   Average minutes spent exercising at this level 60 min         4/22/2024   Social Factors   Frequency of gathering with friends or relatives Once a week   Worry food won't last until get money to buy more No   Food not last or not have enough money for food? No "   Do you have housing?  Yes   Are you worried about losing your housing? No   Lack of transportation? No   Unable to get utilities (heat,electricity)? No         4/22/2024   Fall Risk   Fallen 2 or more times in the past year? No   Trouble with walking or balance? No          4/22/2024   Dental   Dentist two times every year? (!) NO         4/22/2024   TB Screening   Were you born outside of the [place]? No         Today's PHQ-2 Score:       4/22/2024     8:15 AM   PHQ-2 ( 1999 [employer])   Q1: Little interest or pleasure in doing things 0   Q2: Feeling down, depressed or hopeless 0   PHQ-2 Score 0   Q1: Little interest or pleasure in doing things Not at all   Q2: Feeling down, depressed or hopeless Not at all   PHQ-2 Score 0           4/22/2024   Substance Use   Alcohol more than 3/day or more than 7/wk No   Do you use any other substances recreationally? (!) ALCOHOL     Social History     Tobacco Use    Smoking status: Never    Smokeless tobacco: Former     Types: Snuff   Vaping Use    Vaping status: Never Used   Substance Use Topics    Alcohol use: Yes     Alcohol/week: 5.0 standard drinks of alcohol     Types: 5 Cans of beer per week    Drug use: Never           4/22/2024   STI Screening   New sexual partner(s) since last STI/HIV test? No   Last PSA:   Prostate Specific Antigen Screen   Date Value Ref Range Status   04/18/2023 1.00 0.00 - 4.00 ug/L Final     ASCVD Risk   The ASCVD Risk score (Gallo OKEEFE, et al., 2019) failed to calculate for the following reasons:    The valid HDL cholesterol range is 20 to 100 mg/dL           Reviewed and updated as needed this visit by Provider                          Review of Systems  Constitutional, HEENT, cardiovascular, pulmonary, gi and gu systems are negative, except as otherwise noted.     Objective    Exam  /71 (BP Location: Right arm, Patient Position: Sitting, Cuff Size: Adult Large)   Pulse 71   Temp 98.3  F (36.8  C) (Oral)   Resp 16   Ht 1.803 m (5'  "11\")   Wt 82.3 kg (181 lb 6.4 oz)   SpO2 99%   BMI 25.30 kg/m     Estimated body mass index is 25.3 kg/m  as calculated from the following:    Height as of this encounter: 1.803 m (5' 11\").    Weight as of this encounter: 82.3 kg (181 lb 6.4 oz).    Physical Exam  GENERAL: alert and no distress  EYES: Eyes grossly normal to inspection, PERRL and conjunctivae and sclerae normal  HENT: ear canals and TM's normal, nose and mouth without ulcers or lesions  NECK: no adenopathy, no asymmetry, masses, or scars  RESP: lungs clear to auscultation - no rales, rhonchi or wheezes  CV: regular rate and rhythm, normal S1 S2, no S3 or S4, no murmur, click or rub, no peripheral edema  ABDOMEN: soft, nontender, no hepatosplenomegaly, no masses and bowel sounds normal  MS: no gross musculoskeletal defects noted, no edema  SKIN: Raised erythematous lesion on the back with small central ulceration  NEURO: Normal strength and tone, mentation intact and speech normal  PSYCH: mentation appears normal, affect normal/bright        Signed Electronically by: Rick Escamilla MD    "

## 2024-04-23 ENCOUNTER — APPOINTMENT (OUTPATIENT)
Dept: URBAN - METROPOLITAN AREA CLINIC 259 | Age: 59
Setting detail: DERMATOLOGY
End: 2024-04-27

## 2024-04-23 DIAGNOSIS — L82.0 INFLAMED SEBORRHEIC KERATOSIS: ICD-10-CM

## 2024-04-23 DIAGNOSIS — D69.2 OTHER NONTHROMBOCYTOPENIC PURPURA: ICD-10-CM

## 2024-04-23 PROCEDURE — OTHER MIPS QUALITY: OTHER

## 2024-04-23 PROCEDURE — 99202 OFFICE O/P NEW SF 15 MIN: CPT | Mod: 25

## 2024-04-23 PROCEDURE — 17110 DESTRUCT B9 LESION 1-14: CPT

## 2024-04-23 PROCEDURE — OTHER COUNSELING: OTHER

## 2024-04-23 PROCEDURE — OTHER LIQUID NITROGEN: OTHER

## 2024-04-23 ASSESSMENT — LOCATION DETAILED DESCRIPTION DERM
LOCATION DETAILED: RIGHT DISTAL DORSAL FOREARM
LOCATION DETAILED: LEFT MID-UPPER BACK
LOCATION DETAILED: LEFT PROXIMAL DORSAL FOREARM
LOCATION DETAILED: LEFT MEDIAL UPPER BACK
LOCATION DETAILED: INFERIOR THORACIC SPINE

## 2024-04-23 ASSESSMENT — LOCATION SIMPLE DESCRIPTION DERM
LOCATION SIMPLE: LEFT FOREARM
LOCATION SIMPLE: LEFT UPPER BACK
LOCATION SIMPLE: RIGHT FOREARM
LOCATION SIMPLE: UPPER BACK

## 2024-04-23 ASSESSMENT — LOCATION ZONE DERM
LOCATION ZONE: TRUNK
LOCATION ZONE: ARM

## 2024-04-23 NOTE — HPI: SKIN LESION
What Type Of Note Output Would You Prefer (Optional)?: Standard Output
Has Your Skin Lesion Been Treated?: not been treated
Is This A New Presentation, Or A Follow-Up?: Skin Lesion
Additional History: The patient reports he has a lesion on his back and is unsure how long it has been present. He states his wife thinks the lesion is growing and looks unusual. He reports his PCP recommended to have the lesion evaluated. The patient denies bleeding but endorses itchiness.\\n\\nOf note, the patient mentions that he was recently doing some work and injured his forearms, developing bruising. He is on aspirin 81mg and states he bruises easily.

## 2024-04-23 NOTE — PROCEDURE: COUNSELING
Detail Level: Detailed
Patient Specific Counseling (Will Not Stick From Patient To Patient): ***Consider citrus bioflavonoid supplement capsules BID
Detail Level: Simple

## 2024-04-23 NOTE — PROCEDURE: LIQUID NITROGEN
Render Note In Bullet Format When Appropriate: No
Spray Paint Text: The liquid nitrogen was applied to the skin utilizing a spray paint frosting technique.
Render Post-Care Instructions In Note?: yes
Number Of Freeze-Thaw Cycles: 3 freeze-thaw cycles
Post-Care Instructions: I reviewed with the patient in detail post-care instructions. Patient is to wear sunprotection, and avoid picking at any of the treated lesions. Pt may apply Vaseline to crusted or scabbing areas.
Detail Level: Detailed
Application Tool (Optional): Liquid Nitrogen Sprayer
Medical Necessity Information: It is in your best interest to select a reason for this procedure from the list below. All of these items fulfill various CMS LCD requirements except the new and changing color options.
Consent: The patient's consent was obtained including but not limited to risks of crusting, scabbing, blistering, scarring, darker or lighter pigmentary change, recurrence, incomplete removal and infection.
Medical Necessity Clause: This procedure was medically necessary because the lesions that were treated were:

## 2025-02-11 NOTE — PROGRESS NOTES
Chief Complaint: Establish general cardiovascular care    HPI (2/12/25): Jerrod Khan is a 59 year old male with a past medical history of coronary artery disease, ischemic cardiomyopathy, and hypertension who was referred to me to establish general cardiovascular care.  He was last seen by Dr. Jimenez in 2024.    Briefly, when Mr. Khan was last seen in February 2024, he noted that he was feeling well.  He had a TTE that did not show that his cardiac function was normal.    Today, Mr. Khan notes that he walks at least 2.5 miles every night and also does strength training multiple times weekly.  He has a well-rounded diet that is not high and artificial sugars or saturated fats.  He has not had any ischemic or heart failure symptoms.  Mr. Khan's home systolic blood pressures in the 140s.    A comprehensive ROS was done and the details are included above in the HPI.    Past Medical History:  Coronary artery disease (PCI to mid LAD in September 2020 with known  of OM 2 and RCA)  Ischemic cardiomyopathy  Hypertension  High PVC burden    Past Medical History:   Diagnosis Date    Benign essential hypertension     Congestive heart failure (H) 08/2021    I was told I had heart disease in August 2021    Coronary artery disease involving native coronary artery of native heart without angina pectoris 04/17/2023    Essential hypertension 04/17/2023    Family history of premature coronary artery disease 4/18/2023    IFG (impaired fasting glucose) 04/18/2023    Ischemic cardiomyopathy     Other male erectile dysfunction 04/18/2023    PVC's (premature ventricular contractions) 04/17/2023    S/P coronary artery stent placement 09/2020    mild LAD PCI+LENY       Past Surgical History:    Past Surgical History:   Procedure Laterality Date    COLONOSCOPY      CV CORONARY ANGIOGRAM Left 09/10/2022    PCI+ LENY to 70% mid LAD lesion.  Occluded second OMB filled with collateral, proximal occlusion  of RCA filled with collateral    REPAIR CLEFT LIP         Drug History:  Home cardiac meds: Amlodipine 10 mg daily, aspirin 81 mg daily, atorvastatin 40 mg daily, lisinopril 10 mg daily, metoprolol succinate 25 mg daily  Current Outpatient Medications   Medication Sig Dispense Refill    amLODIPine (NORVASC) 10 MG tablet Take 1 tablet (10 mg) by mouth daily 90 tablet 3    aspirin (ASA) 81 MG EC tablet Take 81 mg by mouth      atorvastatin (LIPITOR) 40 MG tablet Take 1 tablet (40 mg) by mouth daily 90 tablet 3    cholecalciferol (VITAMIN D3) 25 mcg (1000 units) capsule Take 1 capsule by mouth daily      Ferrous Sulfate (IRON PO) Take 1 tablet by mouth daily.      lisinopril (ZESTRIL) 20 MG tablet Take 1 tablet (20 mg) by mouth daily. 90 tablet 3    metoprolol succinate ER (TOPROL XL) 25 MG 24 hr tablet Take 1 tablet (25 mg) by mouth daily 90 tablet 3    nitroGLYcerin (NITROSTAT) 0.4 MG sublingual tablet PLEASE SEE ATTACHED FOR DETAILED DIRECTIONS      sildenafil (VIAGRA) 100 MG tablet Take 100 mg by mouth Take as needed           Family History:  - No family history of sudden cardiac death, premature CAD, valvular disorders  Family History   Problem Relation Age of Onset    Kidney Disease Mother 82    Heart Failure Mother     Pacemaker Mother     Myocardial Infarction Father 50    Peripheral Vascular Disease Father 78         OF [place]    Coronary Artery Disease Sister 56        CABG and stents    Myocardial Infarction Maternal Grandmother 52    Myocardial Infarction Paternal Grandmother 42    Myocardial Infarction Paternal Grandfather 63       Social History:    Social History     Tobacco Use    Smoking status: Never    Smokeless tobacco: Former     Types: Snuff   Substance Use Topics    Alcohol use: Yes     Alcohol/week: 5.0 standard drinks of alcohol     Types: 5 Cans of beer per week       Allergies   Allergen Reactions    Simvastatin      Other reaction(s): Unknown  Did not feel right            Physical  "Examination:  Vitals: BP (!) 164/86 (BP Location: Right arm, Patient Position: Chair, Cuff Size: Adult Regular)   Pulse 51   Ht 1.803 m (5' 11\")   Wt 84.8 kg (187 lb)   SpO2 99%   BMI 26.08 kg/m    BMI= Body mass index is 26.08 kg/m .    GENERAL: Healthy, alert and no distress  RESPIRATORY: No signs of resp distress. Lungs CTAB.  CARDIOVASCULAR:  No JVD, regular, normal S1+S2 without added sounds or murmurs.  EXTREMITIES: Warm, well-perfused, no edema.   NEUROLOGY: GCS 15/15, no focal deficits.  SKIN: No ecchymoses, no rashes.  PSYCH: Cooperative, pleasant affect.       Investigations:  I personally viewed and interpreted the following investigations:    Labs:    CBC RESULTS:  Lab Results   Component Value Date    WBC 8.9 04/18/2023    RBC 4.73 04/18/2023    HGB 14.5 04/18/2023    HCT 43.7 04/18/2023    MCV 92 04/18/2023    MCH 30.7 04/18/2023    MCHC 33.2 04/18/2023    RDW 13.0 04/18/2023     04/18/2023       CMP RESULTS:  Lab Results   Component Value Date     04/22/2024     11/20/2020    POTASSIUM 4.7 04/22/2024    POTASSIUM 4.4 04/18/2023    POTASSIUM 4.2 11/20/2020    CHLORIDE 99 04/22/2024    CHLORIDE 101 04/18/2023    CHLORIDE 104 11/20/2020    CO2 27 04/22/2024    CO2 27 04/18/2023    CO2 32 11/20/2020    ANIONGAP 10 04/22/2024    ANIONGAP 5 04/18/2023    ANIONGAP 2 (L) 11/20/2020     (H) 04/22/2024    GLC 99 04/18/2023     (H) 11/20/2020    BUN 13.6 04/22/2024    BUN 10 04/18/2023    BUN 12 11/20/2020    CR 0.81 04/22/2024    CR 0.83 11/20/2020    GFRESTIMATED >90 04/22/2024    GFRESTIMATED >90 11/20/2020    GFRESTBLACK >90 11/20/2020    CLAUS 9.5 04/22/2024    CLAUS 8.9 11/20/2020    BILITOTAL 0.5 04/22/2024    ALBUMIN 4.7 04/22/2024    ALBUMIN 4.1 04/18/2023    ALKPHOS 86 04/22/2024    ALT 35 04/22/2024    AST 29 04/22/2024        LIPIDS:  Cholesterol   Date Value Ref Range Status   04/22/2024 185 <200 mg/dL Final   04/18/2023 170 <200 mg/dL Final   11/20/2020 136 <200 " mg/dL Final     HDL Cholesterol   Date Value Ref Range Status   11/20/2020 79 >39 mg/dL Final     Direct Measure HDL   Date Value Ref Range Status   04/22/2024 110 >=40 mg/dL Final   04/18/2023 115 >=40 mg/dL Final     LDL Cholesterol Calculated   Date Value Ref Range Status   04/22/2024 67 <=100 mg/dL Final   04/18/2023 44 <=100 mg/dL Final   11/20/2020 45 <100 mg/dL Final     Comment:     Desirable:       <100 mg/dl     Triglycerides   Date Value Ref Range Status   04/22/2024 40 <150 mg/dL Final   04/18/2023 54 <150 mg/dL Final   11/20/2020 60 <150 mg/dL Final     Comment:     Fasting specimen           Recent Tests:      Echocardiogram (2/12/2025):  Normal biventricular function without any valvular disorders        Assessment and Plan:   Jerrod Khan is a 59 year old male with a past medical history of ischemic cardiomyopathy, hypertension, and high PVC burden who presented to me in February 2025 to establish general cardiovascular care.    Mr. Khan has an outstanding cardiovascular lifestyle.  His lipids are at goal.  However, we did discuss how his blood pressure is not quite at goal and is an important modifiable risk factor.  In view of this, I made changes today to ensure that he reaches his target.    Problems:  Coronary artery disease (PCI to mid LAD in September 2020 with known  of OM 2 and RCA)  Ischemic cardiomyopathy  Hypertension, stage II  High PVC burden    Plan:  - Increase lisinopril to 20 mg daily  - MTM clinic referral, target blood pressure less than 130/80  - Amlodipine 10 mg daily, aspirin 81 mg daily, atorvastatin 40 mg daily, lisinopril 10 mg daily, metoprolol succinate 25 mg daily      A total of 40 minutes were spent on the day of the visit for chart review, care coordination, face-to-face consultation with the patient, and documentation.       Khari Castillo, MS    Cardiovascular Division    CC  Patient Care Team:  Hamzah Truong MD as PCP -  General (Internal Medicine)  Omkar Jimenez MD as Assigned Heart and Vascular Provider  Rick Escamilla MD as Assigned PCP

## 2025-02-12 ENCOUNTER — OFFICE VISIT (OUTPATIENT)
Dept: CARDIOLOGY | Facility: CLINIC | Age: 60
End: 2025-02-12
Payer: COMMERCIAL

## 2025-02-12 ENCOUNTER — ANCILLARY PROCEDURE (OUTPATIENT)
Dept: CARDIOLOGY | Facility: CLINIC | Age: 60
End: 2025-02-12
Attending: INTERNAL MEDICINE
Payer: COMMERCIAL

## 2025-02-12 VITALS
OXYGEN SATURATION: 99 % | DIASTOLIC BLOOD PRESSURE: 86 MMHG | HEIGHT: 71 IN | SYSTOLIC BLOOD PRESSURE: 164 MMHG | BODY MASS INDEX: 26.18 KG/M2 | HEART RATE: 51 BPM | WEIGHT: 187 LBS

## 2025-02-12 DIAGNOSIS — I49.3 PVC'S (PREMATURE VENTRICULAR CONTRACTIONS): ICD-10-CM

## 2025-02-12 DIAGNOSIS — I10 BENIGN ESSENTIAL HYPERTENSION: Primary | ICD-10-CM

## 2025-02-12 DIAGNOSIS — I25.5 ISCHEMIC CARDIOMYOPATHY: ICD-10-CM

## 2025-02-12 LAB — LVEF ECHO: NORMAL

## 2025-02-12 PROCEDURE — 93306 TTE W/DOPPLER COMPLETE: CPT | Performed by: INTERNAL MEDICINE

## 2025-02-12 RX ORDER — LISINOPRIL 20 MG/1
20 TABLET ORAL DAILY
Qty: 90 TABLET | Refills: 3 | Status: SHIPPED | OUTPATIENT
Start: 2025-02-12 | End: 2026-02-07

## 2025-02-12 NOTE — NURSING NOTE
"Chief Complaint   Patient presents with    Follow Up       Initial BP (!) 164/86 (BP Location: Right arm, Patient Position: Chair, Cuff Size: Adult Regular)   Pulse 51   Ht 1.803 m (5' 11\")   Wt 84.8 kg (187 lb)   SpO2 99%   BMI 26.08 kg/m   Estimated body mass index is 26.08 kg/m  as calculated from the following:    Height as of this encounter: 1.803 m (5' 11\").    Weight as of this encounter: 84.8 kg (187 lb)..  BP completed using cuff size: regular    Shae JONES  "

## 2025-02-12 NOTE — PATIENT INSTRUCTIONS
Take your medicines every day, as directed     Changes made today:  Increase lisinopril to 20 mg daily  Measure blood pressure daily  Medication Management (MTM) clinic referral   No other changes to [facility]        Cardiology Care Coordinators:      Luda VALERA RN       Cardiology Rooming Staff:  Esha BOSE     Phone  [phone]      Fax [phone]    To Contact us     During Business Hours:  [phone]     If you are needing refills please contact your pharmacy.     For urgent after hour care please call the [facility] at [phone] or the [facility] at [phone] and ask to speak to the cardiologist on call.    If you are having a medical emergency, please call 911.            HOW TO CHECK YOUR BLOOD PRESSURE AT HOME:     Avoid eating, smoking, and exercising for at least 30 minutes before taking a reading.     Be sure you have taken your BP medication at least 2-3 hours before you check it.      Sit quietly for 10 minutes before a reading.      Sit in a chair with your feet flat on the floor. Rest your  arm on a table so that the arm cuff is at the same level as your heart.     Remain still during the reading.  Record your blood pressure and pulse in a log and bring to your next appointment.       Use [employer] allows you to communicate directly with your heart team through secure messaging.  [employer] can be accessed any time on your phone, computer, or tablet.  If you need assistance, we'd be happy to help!             Keep your Heart Appointments:     Follow-up with me in 12 months

## 2025-03-24 ENCOUNTER — PATIENT OUTREACH (OUTPATIENT)
Dept: CARE COORDINATION | Facility: CLINIC | Age: 60
End: 2025-03-24
Payer: COMMERCIAL

## 2025-03-25 ENCOUNTER — TELEPHONE (OUTPATIENT)
Dept: CARDIOLOGY | Facility: CLINIC | Age: 60
End: 2025-03-25
Payer: COMMERCIAL

## 2025-03-25 NOTE — TELEPHONE ENCOUNTER
MTM referral from: [facility] visit (referral by provider)    MTM referral outreach attempt #2 on March 25, 2025 at 11:58 AM      Outcome: Patient not reachable after several attempts, routed to Pharmacist Team/Provider as an FYI    Use HB for the carrier/Plan on the flowsheet      [employer] Message Sent    ISSAC Dykes

## 2025-04-07 ENCOUNTER — PATIENT OUTREACH (OUTPATIENT)
Dept: CARE COORDINATION | Facility: CLINIC | Age: 60
End: 2025-04-07
Payer: COMMERCIAL

## 2025-05-11 DIAGNOSIS — I25.10 CORONARY ARTERY DISEASE INVOLVING NATIVE CORONARY ARTERY OF NATIVE HEART WITHOUT ANGINA PECTORIS: ICD-10-CM

## 2025-05-11 DIAGNOSIS — I10 PRIMARY HYPERTENSION: Primary | ICD-10-CM

## 2025-05-13 RX ORDER — AMLODIPINE BESYLATE 10 MG/1
10 TABLET ORAL DAILY
Qty: 90 TABLET | Refills: 0 | Status: SHIPPED | OUTPATIENT
Start: 2025-05-13

## 2025-06-16 SDOH — HEALTH STABILITY: PHYSICAL HEALTH: ON AVERAGE, HOW MANY MINUTES DO YOU ENGAGE IN EXERCISE AT THIS LEVEL?: 60 MIN

## 2025-06-16 SDOH — HEALTH STABILITY: PHYSICAL HEALTH: ON AVERAGE, HOW MANY DAYS PER WEEK DO YOU ENGAGE IN MODERATE TO STRENUOUS EXERCISE (LIKE A BRISK WALK)?: 7 DAYS

## 2025-06-16 ASSESSMENT — SOCIAL DETERMINANTS OF HEALTH (SDOH): HOW OFTEN DO YOU GET TOGETHER WITH FRIENDS OR RELATIVES?: ONCE A WEEK

## 2025-06-18 ENCOUNTER — RESULTS FOLLOW-UP (OUTPATIENT)
Dept: FAMILY MEDICINE | Facility: CLINIC | Age: 60
End: 2025-06-18

## 2025-06-18 ENCOUNTER — OFFICE VISIT (OUTPATIENT)
Dept: FAMILY MEDICINE | Facility: CLINIC | Age: 60
End: 2025-06-18
Attending: INTERNAL MEDICINE
Payer: COMMERCIAL

## 2025-06-18 VITALS
WEIGHT: 187 LBS | OXYGEN SATURATION: 100 % | HEIGHT: 71 IN | TEMPERATURE: 97.3 F | RESPIRATION RATE: 13 BRPM | SYSTOLIC BLOOD PRESSURE: 130 MMHG | BODY MASS INDEX: 26.18 KG/M2 | DIASTOLIC BLOOD PRESSURE: 66 MMHG | HEART RATE: 60 BPM

## 2025-06-18 DIAGNOSIS — Z00.00 ROUTINE GENERAL MEDICAL EXAMINATION AT A HEALTH CARE FACILITY: Primary | ICD-10-CM

## 2025-06-18 DIAGNOSIS — Z12.5 SCREENING FOR PROSTATE CANCER: ICD-10-CM

## 2025-06-18 DIAGNOSIS — Z01.89 ENCOUNTER FOR LABORATORY TEST: ICD-10-CM

## 2025-06-18 DIAGNOSIS — Z13.1 SCREENING FOR DIABETES MELLITUS: ICD-10-CM

## 2025-06-18 DIAGNOSIS — Z13.220 SCREENING FOR HYPERLIPIDEMIA: ICD-10-CM

## 2025-06-18 LAB
ALBUMIN SERPL BCG-MCNC: 4.6 G/DL (ref 3.5–5.2)
ALP SERPL-CCNC: 82 U/L (ref 40–150)
ALT SERPL W P-5'-P-CCNC: 32 U/L (ref 0–70)
ANION GAP SERPL CALCULATED.3IONS-SCNC: 9 MMOL/L (ref 7–15)
AST SERPL W P-5'-P-CCNC: 22 U/L (ref 0–45)
BILIRUB SERPL-MCNC: 0.3 MG/DL
BUN SERPL-MCNC: 11.2 MG/DL (ref 8–23)
CALCIUM SERPL-MCNC: 9.8 MG/DL (ref 8.8–10.4)
CHLORIDE SERPL-SCNC: 102 MMOL/L (ref 98–107)
CHOLEST SERPL-MCNC: 192 MG/DL
CREAT SERPL-MCNC: 0.8 MG/DL (ref 0.67–1.17)
EGFRCR SERPLBLD CKD-EPI 2021: >90 ML/MIN/1.73M2
EST. AVERAGE GLUCOSE BLD GHB EST-MCNC: 108 MG/DL
FASTING STATUS PATIENT QL REPORTED: YES
FASTING STATUS PATIENT QL REPORTED: YES
GLUCOSE SERPL-MCNC: 100 MG/DL (ref 70–99)
HBA1C MFR BLD: 5.4 % (ref 0–5.6)
HCO3 SERPL-SCNC: 27 MMOL/L (ref 22–29)
HDLC SERPL-MCNC: 110 MG/DL
LDLC SERPL CALC-MCNC: 68 MG/DL
MEV IGG SER IA-ACNC: 62.3 AU/ML
MEV IGG SER IA-ACNC: POSITIVE
NONHDLC SERPL-MCNC: 82 MG/DL
POTASSIUM SERPL-SCNC: 4.7 MMOL/L (ref 3.4–5.3)
PROT SERPL-MCNC: 7.3 G/DL (ref 6.4–8.3)
PSA SERPL DL<=0.01 NG/ML-MCNC: 1.34 NG/ML (ref 0–3.5)
SODIUM SERPL-SCNC: 138 MMOL/L (ref 135–145)
TRIGL SERPL-MCNC: 72 MG/DL

## 2025-06-18 PROCEDURE — 3078F DIAST BP <80 MM HG: CPT | Performed by: INTERNAL MEDICINE

## 2025-06-18 PROCEDURE — 36415 COLL VENOUS BLD VENIPUNCTURE: CPT | Performed by: INTERNAL MEDICINE

## 2025-06-18 PROCEDURE — 86765 RUBEOLA ANTIBODY: CPT | Performed by: INTERNAL MEDICINE

## 2025-06-18 PROCEDURE — G0103 PSA SCREENING: HCPCS | Performed by: INTERNAL MEDICINE

## 2025-06-18 PROCEDURE — 83036 HEMOGLOBIN GLYCOSYLATED A1C: CPT | Performed by: INTERNAL MEDICINE

## 2025-06-18 PROCEDURE — 80061 LIPID PANEL: CPT | Performed by: INTERNAL MEDICINE

## 2025-06-18 PROCEDURE — 99396 PREV VISIT EST AGE 40-64: CPT | Performed by: INTERNAL MEDICINE

## 2025-06-18 PROCEDURE — 1126F AMNT PAIN NOTED NONE PRSNT: CPT | Performed by: INTERNAL MEDICINE

## 2025-06-18 PROCEDURE — 3075F SYST BP GE 130 - 139MM HG: CPT | Performed by: INTERNAL MEDICINE

## 2025-06-18 PROCEDURE — 3044F HG A1C LEVEL LT 7.0%: CPT | Performed by: INTERNAL MEDICINE

## 2025-06-18 PROCEDURE — 80053 COMPREHEN METABOLIC PANEL: CPT | Performed by: INTERNAL MEDICINE

## 2025-06-18 ASSESSMENT — PAIN SCALES - GENERAL: PAINLEVEL_OUTOF10: NO PAIN (0)

## 2025-06-18 NOTE — PROGRESS NOTES
"Preventive Care Visit  [facility] JEREMI PITTMAN  Rick Escamilla MD, Internal Medicine  Jun 18, 2025      Assessment & Plan     Routine general medical examination at a health care facility  Up-to-date with all the vaccinations  Today he was concerned about his measles immunity and we decided to check the titers  He follows up with cardiology as he has got history of CAD and ischemic cardiomyopathy  Updated colonoscopy  He had colonoscopy last year again at [facility] and was advised to come back in 5 years  No LUTS symptoms  He also recently had a skin checkup with dermatology  Discussed about diet and exercise  He exercises regularly  Follows up with cardiology for his coronary disease  - Comprehensive metabolic panel (BMP + Alb, Alk Phos, ALT, AST, Total. Bili, TP); Future  - Comprehensive metabolic panel (BMP + Alb, Alk Phos, ALT, AST, Total. Bili, TP)    Screening for hyperlipidemia    - Lipid panel reflex to direct LDL Fasting; Future  - Lipid panel reflex to direct LDL Fasting    Screening for diabetes mellitus    - Hemoglobin A1c; Future  - Hemoglobin A1c    Screening for prostate cancer    - PSA, screen; Future  - PSA, screen    Encounter for laboratory test  He wants to get his will be checked out for measles as he is concerned about that due to the recent outbreaks  - Rubeola Antibody IgG; Future  - Rubeola Antibody IgG    Patient has been advised of split billing requirements and indicates understanding: No        BMI  Estimated body mass index is 26.08 kg/m  as calculated from the following:    Height as of this encounter: 1.803 m (5' 11\").    Weight as of this encounter: 84.8 kg (187 lb).   Weight management plan: Discussed healthy diet and exercise guidelines  Reviewed preventive health counseling, as reflected in patient instructions  Counseling  Appropriate preventive services were addressed with this patient via screening, questionnaire, or discussion as appropriate for fall " prevention, nutrition, physical activity, Tobacco-use cessation, social engagement, weight loss and cognition.  Checklist reviewing preventive services available has been given to the patient.  Reviewed patient's diet, addressing concerns and/or questions.             Heaven Elder is a 60 year old, presenting for the following:  Physical        6/18/2025     6:52 AM   Additional Questions   Roomed by Lizbeth   Accompanied by self         6/18/2025     6:52 AM   Patient Reported Additional Medications   Patient reports taking the following new medications no          HPI  Here for annual physical         Advance Care Planning    Patient states has Health Care Directive and will send to [facility].        6/16/2025   General Health   How would you rate your overall physical health? Good   Feel stress (tense, anxious, or unable to sleep) Not at all         6/16/2025   Nutrition   Three or more servings of calcium each day? Yes   Diet: Low salt   How many servings of fruit and vegetables per day? 4 or more   How many sweetened beverages each day? 0-1         6/16/2025   Exercise   Days per week of moderate/strenous exercise 7 days   Average minutes spent exercising at this level 60 min         6/16/2025   Social Factors   Frequency of gathering with friends or relatives Once a week   Worry food won't last until get money to buy more No   Food not last or not have enough money for food? No   Do you have housing? (Housing is defined as stable permanent housing and does not include staying outside in a car, in a tent, in an abandoned building, in an overnight shelter, or couch-surfing.) Yes   Are you worried about losing your housing? No   Lack of transportation? No   Unable to get utilities (heat,electricity)? No         6/16/2025   Fall Risk   Fallen 2 or more times in the past year? No   Trouble with walking or balance? No          6/16/2025   Dental   Dentist two times every year? Yes           Today's PHQ-2  Score:       2025    10:54 AM   PHQ-2 (  [employer])   Q1: Little interest or pleasure in doing things 0   Q2: Feeling down, depressed or hopeless 0   PHQ-2 Score 0         2025   Substance Use   Alcohol more than 3/day or more than 7/wk No   Do you use any other substances recreationally? No     Social History     Tobacco Use    Smoking status: Never    Smokeless tobacco: Former     Types: Snuff   Vaping Use    Vaping status: Never Used   Substance Use Topics    Alcohol use: Yes     Alcohol/week: 5.0 standard drinks of alcohol     Types: 5 Cans of beer per week    Drug use: Never           2025   STI Screening   New sexual partner(s) since last STI/HIV test? No   Last PSA:   Prostate Specific Antigen Screen   Date Value Ref Range Status   2024 0.84 0.00 - 3.50 ng/mL Final   2023 1.00 0.00 - 4.00 ug/L Final     ASCVD Risk   The ASCVD Risk score (Gallo OKEEFE, et al., 2019) failed to calculate for the following reasons:    The valid HDL cholesterol range is 20 to 100 mg/dL    Fracture Risk Assessment Tool  Link to Frax Calculator  Use the information below to complete the Frax calculator  : 1965  Sex: male  Weight (kg): 84.8 kg (actual weight)  Height (cm): 180.3 cm  Previous Fragility Fracture:  No  History of parent with fractured hip:  No  Current Smoking:  No  Patient has been on glucocorticoids for more than 3 months (5mg/day or more): No  Rheumatoid Arthritis on Problem List:  No  Secondary Osteoporosis on Problem List:  No  Consumes 3 or more units of alcohol per day: No  Femoral Neck BMD (g/cm2)           Reviewed and updated as needed this visit by Provider                          Review of Systems  Constitutional, HEENT, cardiovascular, pulmonary, gi and gu systems are negative, except as otherwise noted.     Objective    Exam  /66 (BP Location: Right arm, Patient Position: Sitting, Cuff Size: Adult Regular)   Pulse 60   Temp 97.3  F (36.3  C) (Temporal)    "Resp 13   Ht 1.803 m (5' 11\")   Wt 84.8 kg (187 lb)   SpO2 100%   BMI 26.08 kg/m     Estimated body mass index is 26.08 kg/m  as calculated from the following:    Height as of this encounter: 1.803 m (5' 11\").    Weight as of this encounter: 84.8 kg (187 lb). Second BP: 130/66     Physical Exam  GENERAL: alert and no distress  EYES: Eyes grossly normal to inspection, PERRL and conjunctivae and sclerae normal  HENT: ear canals and TM's normal, nose and mouth without ulcers or lesions  NECK: no adenopathy, no asymmetry, masses, or scars  RESP: lungs clear to auscultation - no rales, rhonchi or wheezes  CV: regular rate and rhythm, normal S1 S2, no S3 or S4, no murmur, click or rub, no peripheral edema  ABDOMEN: soft, nontender, no hepatosplenomegaly, no masses and bowel sounds normal  MS: no gross musculoskeletal defects noted, no edema  SKIN: no suspicious lesions or rashes  NEURO: Normal strength and tone, mentation intact and speech normal  PSYCH: mentation appears normal, affect normal/bright        Signed Electronically by: Rick Escamilla MD    "

## 2025-06-18 NOTE — PATIENT INSTRUCTIONS
Patient Education   Preventive Care Advice   This is general advice given by our system to help you stay healthy. However, your care team may have specific advice just for you. Please talk to your care team about your preventive care needs.  Nutrition  Eat 5 or more servings of fruits and vegetables each day.  Try wheat bread, brown rice and whole grain pasta (instead of white bread, rice, and pasta).  Get enough calcium and vitamin D. Check the label on foods and aim for 100% of the RDA (recommended daily allowance).  Lifestyle  Exercise at least 150 minutes each week  (30 minutes a day, 5 days a week).  Do muscle strengthening activities 2 days a week. These help control your weight and prevent disease.  No smoking.  Wear sunscreen to prevent skin cancer.  Have a dental exam and cleaning every 6 months.  Yearly exams  See your health care team every year to talk about:  Any changes in your health.  Any medicines your care team has prescribed.  Preventive care, family planning, and ways to prevent chronic diseases.  Shots (vaccines)   HPV shots (up to age 26), if you've never had them before.  Hepatitis B shots (up to age 59), if you've never had them before.  COVID-19 shot: Get this shot when it's due.  Flu shot: Get a flu shot every year.  Tetanus shot: Get a tetanus shot every 10 years.  Pneumococcal, hepatitis A, and RSV shots: Ask your care team if you need these based on your risk.  Shingles shot (for age 50 and up)  General health tests  Diabetes screening:  Starting at age 35, Get screened for diabetes at least every 3 years.  If you are younger than age 35, ask your care team if you should be screened for diabetes.  Cholesterol test: At age 39, start having a cholesterol test every 5 years, or more often if advised.  Bone density scan (DEXA): At age 50, ask your care team if you should have this scan for osteoporosis (brittle bones).  Hepatitis C: Get tested at least once in your life.  STIs (sexually  transmitted infections)  Before age 24: Ask your care team if you should be screened for STIs.  After age 24: Get screened for STIs if you're at risk. You are at risk for STIs (including HIV) if:  You are sexually active with more than one person.  You don't use condoms every time.  You or a partner was diagnosed with a sexually transmitted infection.  If you are at risk for HIV, ask about PrEP medicine to prevent HIV.  Get tested for HIV at least once in your life, whether you are at risk for HIV or not.  Cancer screening tests  Cervical cancer screening: If you have a cervix, begin getting regular cervical cancer screening tests starting at age 21.  Breast cancer scan (mammogram): If you've ever had breasts, begin having regular mammograms starting at age 40. This is a scan to check for breast cancer.  Colon cancer screening: It is important to start screening for colon cancer at age 45.  Have a colonoscopy test every 10 years (or more often if you're at risk) Or, ask your provider about stool tests like a FIT test every year or Cologuard test every 3 years.  To learn more about your testing options, visit:   .  For help making a decision, visit:   https://bit.ly/bm41752.  Prostate cancer screening test: If you have a prostate, ask your care team if a prostate cancer screening test (PSA) at age 55 is right for you.  Lung cancer screening: If you are a current or former smoker ages 50 to 80, ask your care team if ongoing lung cancer screenings are right for you.  For informational purposes only. Not to replace the advice of your health care provider. Copyright   2023 [employer]. All rights reserved. Clinically reviewed by the [facility] Transitions Program. [employer] [id number] - REV 01/24.

## 2025-06-23 DIAGNOSIS — I25.5 ISCHEMIC CARDIOMYOPATHY: ICD-10-CM

## 2025-06-23 RX ORDER — METOPROLOL SUCCINATE 25 MG/1
25 TABLET, EXTENDED RELEASE ORAL DAILY
Qty: 90 TABLET | Refills: 2 | Status: SHIPPED | OUTPATIENT
Start: 2025-06-23

## 2025-06-23 NOTE — TELEPHONE ENCOUNTER
metoprolol succinate ER (TOPROL XL) 25 MG 24 hr tablet   Start: 03/20/2025   Disp 90  R  0  Take 1 tablet (25 mg) by mouth daily.     Hernan Brock MD  Cardiovascular Disease  Lv 2/12/25  Nv  none    Refill decision: Medication refilled per  Medication Refill in Ambulatory Care  policy.    Request from pharmacy:  Requested Prescriptions   Pending Prescriptions Disp Refills    metoprolol succinate ER (TOPROL XL) 25 MG 24 hr tablet 90 tablet 0     Sig: Take 1 tablet (25 mg) by mouth daily.       Beta-Blockers Protocol Passed - 6/23/2025  9:56 AM        Passed - Most recent blood pressure under 140/90 in past 12 months     BP Readings from Last 3 Encounters:   06/18/25 130/66   02/12/25 (!) 164/86   04/22/24 136/71       No data recorded            Passed - Patient is age 6 or older        Passed - Medication is active on med list and the sig matches. RN to manually verify dose and sig if red X/fail.     If the protocol passes (green check), you do not need to verify med dose and sig.    A prescription matches if they are the same clinical intention.    For Example: once daily and every morning are the same.    The protocol can not identify upper and lower case letters as matching and will fail.     For Example: Take 1 tablet (50 mg) by mouth daily     TAKE 1 TABLET (50 MG) BY MOUTH DAILY    For all fails (red x), verify dose and sig.    If the refill does match what is on file, the RN can still proceed to approve the refill request.       If they do not match, route to the appropriate provider.             Passed - Medication indicated for associated diagnosis     Medication is associated with one or more of the following diagnoses:     Hypertension (HTN)   Atrial fibrillation/flutter   Angina   ASCVD   Migraine   Heart Failure   Tremor   Anxiety   Ocular hypertension   Glaucoma   PTSD   Obstructive hypertrophic cardiomyopathy   History of myocarditis   Palpitations   POTS (postural orthostatic tachycardia  syndrome)   SVT (supraventricular tachycardia)   Hyperthyroidism   Tachycardia   Acute non-st segment elevation myocardial infarction   Subsequent non-st segment elevation myocardial infarction   Ischemic myocardial dysfunction          Passed - Recent (12 month) or future (90 days) visit with authorizing provider's specialty (provided they have been seen in the past 15 months)     The patient must have completed an in-person or virtual visit within the past 12 months or has a future visit scheduled within the next 90 days with the authorizing provider s specialty.  Urgent care and e-visits do not qualify as an office visit for this protocol.

## 2025-08-11 DIAGNOSIS — I25.10 CORONARY ARTERY DISEASE INVOLVING NATIVE CORONARY ARTERY OF NATIVE HEART WITHOUT ANGINA PECTORIS: Primary | ICD-10-CM

## 2025-08-11 RX ORDER — AMLODIPINE BESYLATE 10 MG/1
10 TABLET ORAL DAILY
Qty: 90 TABLET | Refills: 2 | Status: SHIPPED | OUTPATIENT
Start: 2025-08-11

## (undated) RX ORDER — REGADENOSON 0.08 MG/ML
INJECTION, SOLUTION INTRAVENOUS
Status: DISPENSED
Start: 2020-12-30

## (undated) RX ORDER — AMINOPHYLLINE 25 MG/ML
INJECTION, SOLUTION INTRAVENOUS
Status: DISPENSED
Start: 2020-12-30